# Patient Record
Sex: FEMALE | Race: WHITE | NOT HISPANIC OR LATINO | Employment: OTHER | URBAN - METROPOLITAN AREA
[De-identification: names, ages, dates, MRNs, and addresses within clinical notes are randomized per-mention and may not be internally consistent; named-entity substitution may affect disease eponyms.]

---

## 2017-03-01 ENCOUNTER — TELEPHONE (OUTPATIENT)
Dept: FAMILY MEDICINE | Facility: CLINIC | Age: 62
End: 2017-03-01

## 2017-03-01 NOTE — TELEPHONE ENCOUNTER
Social [employer]ation [employer], Survivors and Disability Insurance  Form placed in forms box on green side.  Melisa Watson

## 2017-03-06 ENCOUNTER — TELEPHONE (OUTPATIENT)
Dept: FAMILY MEDICINE | Facility: CLINIC | Age: 62
End: 2017-03-06

## 2017-03-06 NOTE — TELEPHONE ENCOUNTER
**When finished: Please make a copy for station  and a copy to be sent to scanning. Send originals with patient.     Date received: March 6, 2017   Doctor: Amarilis Miranda MD  Daytime phone number:   :     y: Mail back to patient  y: Mail or fax to destination requesting form  n: Patient to     Other notes:

## 2017-05-01 ENCOUNTER — TELEPHONE (OUTPATIENT)
Dept: FAMILY MEDICINE | Facility: CLINIC | Age: 62
End: 2017-05-01

## 2017-07-06 ENCOUNTER — TELEPHONE (OUTPATIENT)
Dept: FAMILY MEDICINE | Facility: CLINIC | Age: 62
End: 2017-07-06

## 2017-07-06 NOTE — TELEPHONE ENCOUNTER
Panel Management Review      Patient has the following on her problem list: None      Composite cancer screening  Chart review shows that this patient is due/due soon for the following Mammogram and Fecal Colorectal (FIT)  Summary:    Patient is due/failing the following:   tetanus, FIT and MAMMOGRAM    Action needed:   Patient needs office visit.    Type of outreach:    patient declined screenings    Questions for provider review:    None                                                                                                                                    Fern Blum [facility]     Chart routed to none .

## 2017-07-14 DIAGNOSIS — E78.5 HYPERLIPIDEMIA LDL GOAL <130: ICD-10-CM

## 2017-07-14 NOTE — TELEPHONE ENCOUNTER
Pravastatin     Last Written Prescription Date: 06/14/16  Last Fill Quantity: 90, # refills: 3  Last Office Visit with [facility] or [facility] prescribing provider: 06/14/16       Lab Results   Component Value Date    CHOL 227 12/31/2014     Lab Results   Component Value Date    HDL 49 12/31/2014     Lab Results   Component Value Date     12/31/2014     Lab Results   Component Value Date    TRIG 196 12/31/2014     Lab Results   Component Value Date    CHOLHDLRATIO 4.6 12/31/2014

## 2017-07-17 RX ORDER — PRAVASTATIN SODIUM 80 MG/1
80 TABLET ORAL DAILY
Qty: 30 TABLET | Refills: 0 | Status: SHIPPED | OUTPATIENT
Start: 2017-07-17 | End: 2017-08-16

## 2017-08-07 DIAGNOSIS — E78.5 HYPERLIPIDEMIA LDL GOAL <130: ICD-10-CM

## 2017-08-07 DIAGNOSIS — I10 ESSENTIAL HYPERTENSION, BENIGN: ICD-10-CM

## 2017-08-07 NOTE — TELEPHONE ENCOUNTER
HCTZ     Last Written Prescription Date: 06/14/16  Last Fill Quantity: 90, # refills: 3  Last Office Visit with [facility] or [facility] prescribing provider: 06/14/16       Potassium   Date Value Ref Range Status   12/31/2014 4.1 3.4 - 5.3 mmol/L Final     Creatinine   Date Value Ref Range Status   12/31/2014 0.66 0.52 - 1.04 mg/dL Final     BP Readings from Last 3 Encounters:   06/14/16 123/82   09/25/15 130/80   01/15/15 130/74     Metoprolol and Amlodipine  Last Written Prescription Date: 06/14/16  Last Fill Quantity: 90,  # refills: 3   Last Office Visit with [facility], [facility] or [facility] prescribing provider: 06/14/16                                             Pravastatin     Last Written Prescription Date: 07/17/17  Last Fill Quantity: 30, # refills: 0  Last Office Visit with [facility], [facility] or [facility] prescribing provider: 06/14/16       Lab Results   Component Value Date    CHOL 227 12/31/2014     Lab Results   Component Value Date    HDL 49 12/31/2014     Lab Results   Component Value Date     12/31/2014     Lab Results   Component Value Date    TRIG 196 12/31/2014     Lab Results   Component Value Date    CHOLHDLRATIO 4.6 12/31/2014

## 2017-08-08 RX ORDER — PRAVASTATIN SODIUM 80 MG/1
TABLET ORAL
Qty: 30 TABLET | Refills: 0 | OUTPATIENT
Start: 2017-08-08

## 2017-08-08 RX ORDER — METOPROLOL TARTRATE 50 MG
TABLET ORAL
Qty: 90 TABLET | Refills: 1 | Status: SHIPPED | OUTPATIENT
Start: 2017-08-08 | End: 2017-10-24

## 2017-08-08 RX ORDER — HYDROCHLOROTHIAZIDE 25 MG/1
TABLET ORAL
Qty: 90 TABLET | Refills: 3 | OUTPATIENT
Start: 2017-08-08

## 2017-08-08 RX ORDER — AMLODIPINE BESYLATE 5 MG/1
TABLET ORAL
Qty: 90 TABLET | Refills: 3 | OUTPATIENT
Start: 2017-08-08

## 2017-08-14 ENCOUNTER — OFFICE VISIT (OUTPATIENT)
Dept: FAMILY MEDICINE | Facility: CLINIC | Age: 62
End: 2017-08-14

## 2017-08-14 VITALS
BODY MASS INDEX: 27.19 KG/M2 | DIASTOLIC BLOOD PRESSURE: 68 MMHG | WEIGHT: 158.4 LBS | SYSTOLIC BLOOD PRESSURE: 114 MMHG | HEART RATE: 67 BPM

## 2017-08-14 DIAGNOSIS — E78.5 HYPERLIPIDEMIA LDL GOAL <130: Primary | ICD-10-CM

## 2017-08-14 DIAGNOSIS — I10 ESSENTIAL HYPERTENSION, BENIGN: ICD-10-CM

## 2017-08-14 DIAGNOSIS — I10 BENIGN ESSENTIAL HYPERTENSION: ICD-10-CM

## 2017-08-14 DIAGNOSIS — Z72.0 TOBACCO ABUSE: ICD-10-CM

## 2017-08-14 DIAGNOSIS — Z12.11 SPECIAL SCREENING FOR MALIGNANT NEOPLASMS, COLON: ICD-10-CM

## 2017-08-14 DIAGNOSIS — I47.10 PAROXYSMAL SUPRAVENTRICULAR TACHYCARDIA (H): ICD-10-CM

## 2017-08-14 PROCEDURE — 99213 OFFICE O/P EST LOW 20 MIN: CPT | Performed by: FAMILY MEDICINE

## 2017-08-14 NOTE — MR AVS SNAPSHOT
"              After Visit Summary   8/14/2017    Katherine Love    MRN: [id number]           Patient Information     Date Of Birth          1955        Visit Information        Provider Department      8/14/2017 2:40 PM Amarilis Miranda MD [facility]        Today's Diagnoses     Hyperlipidemia LDL goal <130    -  1    Benign essential hypertension        Special screening for malignant neoplasms, colon        Tobacco abuse        Paroxysmal supraventricular tachycardia (H)           Follow-ups after your visit        Future tests that were ordered for you today     Open Future Orders        Priority Expected Expires Ordered    Fecal colorectal cancer screen (FIT) Routine 9/4/2017 11/6/2017 8/14/2017            Who to contact     If you have questions or need follow up information about today's clinic visit or your schedule please contact [facility] directly at [phone].  Normal or non-critical lab and imaging results will be communicated to you by [employer], letter or phone within 4 business days after the clinic has received the results. If you do not hear from us within 7 days, please contact the clinic through [employer] or phone. If you have a critical or abnormal lab result, we will notify you by phone as soon as possible.  Submit refill requests through [employer] or call your pharmacy and they will forward the refill request to us. Please allow 3 business days for your refill to be completed.          Additional Information About Your Visit        MyChart Information     [employer] lets you send messages to your doctor, view your test results, renew your prescriptions, schedule appointments and more. To sign up, go to [email or web address] . Click on \"Log in\" on the left side of the screen, which will take you to the Welcome page. Then click on \"Sign up Now\" on the right side of the page.     You will be asked to enter the access code listed below, as well as some " personal information. Please follow the directions to create your username and password.     Your access code is: [email or web address]  Expires: 2017  3:41 PM     Your access code will  in 90 days. If you need help or a new code, please call your [place] clinic or [phone].        Care EveryWhere ID     This is your Care EveryWhere ID. This could be used by other organizations to access your [place] medical records  [id number]        Your Vitals Were     Pulse Last Period BMI (Body Mass Index)             67 2005 27.19 kg/m2          Blood Pressure from Last 3 Encounters:   17 114/68   16 123/82   09/25/15 130/80    Weight from Last 3 Encounters:   17 158 lb 6.4 oz (71.8 kg)   16 153 lb (69.4 kg)   09/25/15 154 lb (69.9 kg)              We Performed the Following     Basic metabolic panel     Lipid panel reflex to direct LDL        Primary Care Provider Office Phone # Fax #    Thalia Chin, HARRIET [phone]       XXX RESIGNED XXX [address] [address]        Equal Access to Services     LAVERNE REYNOLDS : Hadii aad ku hadasho Soomaali, waaxda luqadaha, qaybta kaalmada adeegyada, hao davisin haycarlos eduardon heidi silva . So [facility] [phone].    ATENCIÓN: Si habla español, tiene a blackwell disposición servicios gratuitos de asistencia lingüística. Llame al [phone].    We comply with applicable federal civil rights laws and [place] laws. We do not discriminate on the basis of race, color, national origin, age, disability sex, sexual orientation or gender identity.            Thank you!     Thank you for choosing [facility]  for your care. Our goal is always to provide you with excellent care. Hearing back from our patients is one way we can continue to improve our services. Please take a few minutes to complete the written survey that you may receive in the mail after your visit with us. Thank you!             Your  Updated Medication List - Protect others around you: Learn how to safely use, store and throw away your medicines at www.disposemymeds.org.          This list is accurate as of: 8/14/17  3:41 PM.  Always use your most recent med list.                   Brand Name Dispense Instructions for use Diagnosis    amLODIPine 5 MG tablet    NORVASC    90 tablet    Take 1 tablet (5 mg) by mouth daily    Essential hypertension, benign       hydrochlorothiazide 25 MG tablet    HYDRODIURIL    90 tablet    Take 1 tablet (25 mg) by mouth daily    Essential hypertension, benign       metoprolol 50 MG tablet    LOPRESSOR    90 tablet    TAKE ONE TABLET BY MOUTH EVERY NIGHT AT BEDTIME    Essential hypertension, benign       pravastatin 80 MG tablet    PRAVACHOL    30 tablet    Take 1 tablet (80 mg) by mouth daily (Needs follow-up appointment for this medication)    Hyperlipidemia LDL goal <130

## 2017-08-14 NOTE — PROGRESS NOTES
SUBJECTIVE:                                                    Katherine Love is a 61 year old female who presents to clinic today for the following health issues:    Chief Complaint   Patient presents with     Refill Request     medication refills     Hypertension     Hypertension Follow-up    Outpatient blood pressures are not being checked.    Low Salt Diet: low salt    Amount of exercise or physical activity: None but active during the day    Problems taking medications regularly: No    Medication side effects: none  Diet: regular (no restrictions)      She is still smoking lightly, does not plan to quit, figures a pack now lasts her a week.  She is due for a lipid recheck but wants to come back fasting for that, so lab orders will be placed as future and she will make a separate appointment.    Current Outpatient Prescriptions   Medication Sig     metoprolol (LOPRESSOR) 50 MG tablet TAKE ONE TABLET BY MOUTH EVERY NIGHT AT BEDTIME     pravastatin (PRAVACHOL) 80 MG tablet Take 1 tablet (80 mg) by mouth daily (Needs follow-up appointment for this medication)     amLODIPine (NORVASC) 5 MG tablet Take 1 tablet (5 mg) by mouth daily     hydrochlorothiazide (HYDRODIURIL) 25 MG tablet Take 1 tablet (25 mg) by mouth daily     She is followed for hypertension, and is on a beta blocker for a history of benign PSVT.  She was once diagnosed with mitral valve prolapse on an echocardiogram, but this was not confirmed on a subsequent echo.    She is requesting medications refills, but is overdue for lab update, so will be given a short refill until she accomplishes this.    She has not had a mammogram since 2014 but declines to consider one at this time.    She is willing to take home a FIT test.    She is overdue for an Adacel, is also eligible for a Zostavax and given her smoking history, for a Pneumovax, but declines any of these at this time.    She has no complaints, denies chest pains, rarely gets palpitations any more,  has no GI, , joint or skin concerns.    OBJECTIVE: /68 (BP Location: Right arm, Patient Position: Chair, Cuff Size: Adult Regular)  Pulse 67  Wt 158 lb 6.4 oz (71.8 kg)  LMP 05/08/2005  BMI 27.19 kg/m2    ASSESSMENT: hypertension, well controlled    PLAN: Fasting lipid profile (she prefers this rather than just getting a nonfasting profile now) and BMP ordered as future labs.  Metoprolol was already renewed for six months, and I will renew her amlodipine for the same time, but pravastatin and HCTZ will just be renewed for one month as we do not yet have current labs and as of the time of this note 24 hours after her visit, she has not yet made a lab appointment. She will need to select a new PCP and get additional refills of these when needed. She is encouraged to come in for a flu shot and general wellness exam this fall to establish care with the new PCP.    Amarilis Miranda md

## 2017-08-14 NOTE — NURSING NOTE
"Chief Complaint   Patient presents with     Refill Request     medication refills     Hypertension       Initial /68 (BP Location: Right arm, Patient Position: Chair, Cuff Size: Adult Regular)  Pulse 67  Wt 158 lb 6.4 oz (71.8 kg)  LMP 05/08/2005  BMI 27.19 kg/m2 Estimated body mass index is 27.19 kg/(m^2) as calculated from the following:    Height as of 6/14/16: 5' 4\" (1.626 m).    Weight as of this encounter: 158 lb 6.4 oz (71.8 kg).  Medication Reconciliation: complete   Fern Blum CMA    "

## 2017-08-16 RX ORDER — PRAVASTATIN SODIUM 80 MG/1
80 TABLET ORAL DAILY
Qty: 30 TABLET | Refills: 0 | Status: SHIPPED | OUTPATIENT
Start: 2017-08-16 | End: 2017-09-19

## 2017-08-16 RX ORDER — HYDROCHLOROTHIAZIDE 25 MG/1
25 TABLET ORAL DAILY
Qty: 30 TABLET | Refills: 0 | Status: SHIPPED | OUTPATIENT
Start: 2017-08-16 | End: 2017-09-19

## 2017-08-16 RX ORDER — AMLODIPINE BESYLATE 5 MG/1
5 TABLET ORAL DAILY
Qty: 90 TABLET | Refills: 1 | Status: SHIPPED | OUTPATIENT
Start: 2017-08-16 | End: 2017-10-24

## 2017-09-19 DIAGNOSIS — I10 ESSENTIAL HYPERTENSION, BENIGN: ICD-10-CM

## 2017-09-19 DIAGNOSIS — E78.5 HYPERLIPIDEMIA LDL GOAL <130: ICD-10-CM

## 2017-09-19 NOTE — TELEPHONE ENCOUNTER
HCTZ      Last Written Prescription Date: 08/16/2017  Last Fill Quantity: 30, # refills: 0  Last Office Visit with [facility], [facility] or [facility] prescribing provider: 08/14/2017       Potassium   Date Value Ref Range Status   12/31/2014 4.1 3.4 - 5.3 mmol/L Final     Creatinine   Date Value Ref Range Status   12/31/2014 0.66 0.52 - 1.04 mg/dL Final     BP Readings from Last 3 Encounters:   08/14/17 114/68   06/14/16 123/82   09/25/15 130/80       Pravastatin     Last Written Prescription Date: 08/16/2017  Last Fill Quantity: 30, # refills: 0  Last Office Visit with [facility], [facility] or [facility] prescribing provider: 08/14/2017       Lab Results   Component Value Date    CHOL 227 12/31/2014     Lab Results   Component Value Date    HDL 49 12/31/2014     Lab Results   Component Value Date     12/31/2014     Lab Results   Component Value Date    TRIG 196 12/31/2014     Lab Results   Component Value Date    CHOLHDLRATIO 4.6 12/31/2014     Horacio POWERS (R)

## 2017-09-20 RX ORDER — PRAVASTATIN SODIUM 80 MG/1
80 TABLET ORAL DAILY
Qty: 30 TABLET | Refills: 0 | Status: SHIPPED | OUTPATIENT
Start: 2017-09-20 | End: 2017-10-19

## 2017-09-20 RX ORDER — HYDROCHLOROTHIAZIDE 25 MG/1
TABLET ORAL
Qty: 30 TABLET | Refills: 0 | Status: SHIPPED | OUTPATIENT
Start: 2017-09-20 | End: 2017-10-19

## 2017-09-21 ENCOUNTER — TELEPHONE (OUTPATIENT)
Dept: FAMILY MEDICINE | Facility: CLINIC | Age: 62
End: 2017-09-21

## 2017-09-21 NOTE — TELEPHONE ENCOUNTER
Panel Management Review      Patient has the following on her problem list:     Hypertension   Last three blood pressure readings:  BP Readings from Last 3 Encounters:   08/14/17 114/68   06/14/16 123/82   09/25/15 130/80     Blood pressure: Passed    HTN Guidelines:  Age 18-59 BP range:  Less than 140/90  Age 60-85 with Diabetes:  Less than 140/90  Age 60-85 without Diabetes:  less than 150/90        Composite cancer screening  Chart review shows that this patient is due/due soon for the following Mammogram and Fecal Colorectal (FIT)  Summary:    Patient is due/failing the following:   FIT and MAMMOGRAM    Action needed:   Patient needs referral/order: mammo/fit    Type of outreach:    Phone, left message for patient to call back.     Questions for provider review:    None                                                                                                                                    Chrissy Wilson MA       Chart routed to Care Team .

## 2017-10-19 DIAGNOSIS — I10 ESSENTIAL HYPERTENSION, BENIGN: ICD-10-CM

## 2017-10-19 DIAGNOSIS — E78.5 HYPERLIPIDEMIA LDL GOAL <130: ICD-10-CM

## 2017-10-19 NOTE — LETTER
[facility]  [address]  [facility] [address]  Phone: [phone]    October 20, 2017    Katherine Love                                                                                                                         [id number] [facility] [address]            Dear Ms. Love,    We are concerned about your health care.  We recently provided you with a medication refill.  Many medications require routine follow-up with your Doctor.      At this time we ask that: You make an appointment for for routine labs for medication monitoring. You will also need to establish care with a Primary Care Provider to follow your medications and refill them. (Dr. Amarilis Miranda has retired).    Your prescription: Has been refilled for 1 month so you may have time for the above noted follow-up. Please be seen/have lab work drawn prior to needing your next refill of medication.     Thank you,    [facility] RN

## 2017-10-20 RX ORDER — PRAVASTATIN SODIUM 80 MG/1
TABLET ORAL
Qty: 30 TABLET | Refills: 0 | Status: SHIPPED | OUTPATIENT
Start: 2017-10-20 | End: 2017-10-24

## 2017-10-20 RX ORDER — HYDROCHLOROTHIAZIDE 25 MG/1
TABLET ORAL
Qty: 30 TABLET | Refills: 0 | Status: SHIPPED | OUTPATIENT
Start: 2017-10-20 | End: 2017-10-24

## 2017-10-20 NOTE — TELEPHONE ENCOUNTER
Needs appointment for lab and new PCP. Letter sent and note sent to pharmacy as well. Kathe Zaman RN

## 2017-10-23 ENCOUNTER — ALLIED HEALTH/NURSE VISIT (OUTPATIENT)
Dept: FAMILY MEDICINE | Facility: CLINIC | Age: 62
End: 2017-10-23

## 2017-10-23 DIAGNOSIS — Z76.0 ENCOUNTER FOR MEDICATION REFILL: Primary | ICD-10-CM

## 2017-10-23 DIAGNOSIS — E78.5 HYPERLIPIDEMIA LDL GOAL <130: ICD-10-CM

## 2017-10-23 DIAGNOSIS — I10 ESSENTIAL HYPERTENSION, BENIGN: Primary | ICD-10-CM

## 2017-10-23 LAB
ANION GAP SERPL CALCULATED.3IONS-SCNC: 5 MMOL/L (ref 3–14)
BUN SERPL-MCNC: 11 MG/DL (ref 7–30)
CALCIUM SERPL-MCNC: 9.3 MG/DL (ref 8.5–10.1)
CHLORIDE SERPL-SCNC: 101 MMOL/L (ref 94–109)
CHOLEST SERPL-MCNC: 195 MG/DL
CO2 SERPL-SCNC: 33 MMOL/L (ref 20–32)
CREAT SERPL-MCNC: 0.62 MG/DL (ref 0.52–1.04)
GFR SERPL CREATININE-BSD FRML MDRD: >90 ML/MIN/1.7M2
GLUCOSE SERPL-MCNC: 106 MG/DL (ref 70–99)
HDLC SERPL-MCNC: 48 MG/DL
LDLC SERPL CALC-MCNC: 107 MG/DL
NONHDLC SERPL-MCNC: 147 MG/DL
POTASSIUM SERPL-SCNC: 2.9 MMOL/L (ref 3.4–5.3)
SODIUM SERPL-SCNC: 139 MMOL/L (ref 133–144)
TRIGL SERPL-MCNC: 198 MG/DL

## 2017-10-23 PROCEDURE — 36415 COLL VENOUS BLD VENIPUNCTURE: CPT | Performed by: NURSE PRACTITIONER

## 2017-10-23 PROCEDURE — 99207 ZZC NO CHARGE NURSE ONLY: CPT

## 2017-10-23 PROCEDURE — 80061 LIPID PANEL: CPT | Performed by: NURSE PRACTITIONER

## 2017-10-23 PROCEDURE — 80048 BASIC METABOLIC PNL TOTAL CA: CPT | Performed by: NURSE PRACTITIONER

## 2017-10-23 NOTE — MR AVS SNAPSHOT
After Visit Summary   10/23/2017    Katherine Love    MRN: [id number]           Patient Information     Date Of Birth          1955        Visit Information        Provider Department      10/23/2017 10:30 AM [place] CL RN [facility]        Today's Diagnoses     Encounter for medication refill    -  1       Follow-ups after your visit        Your next 10 appointments already scheduled     Oct 23, 2017 11:00 AM CDT   LAB with CL LAB   [facility]    [address] [facility] [address]   [phone]           Patient must bring picture ID. Patient should be prepared to give a urine specimen  Please do not eat 10-12 hours before your appointment if you are coming in fasting for labs on lipids, cholesterol, or glucose (sugar). Pregnant women should follow their Care Team instructions. Water with medications is okay. Do not drink coffee or other fluids. If you have concerns about taking  your medications, please ask at office or if scheduling via [employer], send a message by clicking on Secure Messaging, Message Your Care Team.            Oct 24, 2017 11:00 AM CDT   SHORT with HARRIET Montez CNP   [facility]    [address] [id number] [phone]              Future tests that were ordered for you today     Open Future Orders        Priority Expected Expires Ordered    Basic metabolic panel Routine  11/23/2017 10/23/2017    Lipid panel reflex to direct LDL Routine  11/23/2017 10/23/2017            Who to contact     If you have questions or need follow up information about today's clinic visit or your schedule please contact [facility] directly at [phone].  Normal or non-critical lab and imaging results will be communicated to you by MyChart, letter or phone within 4 business days after the clinic has received the results.  "If you do not hear from us within 7 days, please contact the clinic through [employer] or phone. If you have a critical or abnormal lab result, we will notify you by phone as soon as possible.  Submit refill requests through [employer] or call your pharmacy and they will forward the refill request to us. Please allow 3 business days for your refill to be completed.          Additional Information About Your Visit        [employer] Information     [employer] lets you send messages to your doctor, view your test results, renew your prescriptions, schedule appointments and more. To sign up, go to [email or web address] . Click on \"Log in\" on the left side of the screen, which will take you to the Welcome page. Then click on \"Sign up Now\" on the right side of the page.     You will be asked to enter the access code listed below, as well as some personal information. Please follow the directions to create your username and password.     Your access code is: [email or web address]  Expires: 2017  3:41 PM     Your access code will  in 90 days. If you need help or a new code, please call your [place] clinic or [phone].        Care EveryWhere ID     This is your Care EveryWhere ID. This could be used by other organizations to access your [place] medical records  [id number]        Your Vitals Were     Last Period                   2005            Blood Pressure from Last 3 Encounters:   17 114/68   16 123/82   09/25/15 130/80    Weight from Last 3 Encounters:   17 158 lb 6.4 oz (71.8 kg)   16 153 lb (69.4 kg)   09/25/15 154 lb (69.9 kg)              Today, you had the following     No orders found for display       Primary Care Provider Fax #    Provider Not In System [phone]                Equal Access to Services     KELSEY REYNOLDS : Cat Knapp, waana maria dunaway, qacarlos zhang, hao gayle. So [facility] [phone].    ATENCIÓN: Si " matthew medina, tiene a blackwell disposición servicios gratuitos de asistencia lingüística. Mary mendez [phone].    We comply with applicable federal civil rights laws and [place] laws. We do not discriminate on the basis of race, color, national origin, age, disability, sex, sexual orientation, or gender identity.            Thank you!     Thank you for choosing [facility]  for your care. Our goal is always to provide you with excellent care. Hearing back from our patients is one way we can continue to improve our services. Please take a few minutes to complete the written survey that you may receive in the mail after your visit with us. Thank you!             Your Updated Medication List - Protect others around you: Learn how to safely use, store and throw away your medicines at www.disposemymeds.org.          This list is accurate as of: 10/23/17 10:35 AM.  Always use your most recent med list.                   Brand Name Dispense Instructions for use Diagnosis    amLODIPine 5 MG tablet    NORVASC    90 tablet    Take 1 tablet (5 mg) by mouth daily    Essential hypertension, benign       hydrochlorothiazide 25 MG tablet    HYDRODIURIL    30 tablet    TAKE ONE TABLET BY MOUTH EVERY DAY (NEEDS LAB APPT.)    Essential hypertension, benign       metoprolol 50 MG tablet    LOPRESSOR    90 tablet    TAKE ONE TABLET BY MOUTH EVERY NIGHT AT BEDTIME    Essential hypertension, benign       pravastatin 80 MG tablet    PRAVACHOL    30 tablet    TAKE ONE TABLET BY MOUTH EVERY DAY (NEEDS FASTING LAB WORK)    Hyperlipidemia LDL goal <130

## 2017-10-24 ENCOUNTER — OFFICE VISIT (OUTPATIENT)
Dept: FAMILY MEDICINE | Facility: CLINIC | Age: 62
End: 2017-10-24

## 2017-10-24 VITALS
DIASTOLIC BLOOD PRESSURE: 77 MMHG | TEMPERATURE: 99.3 F | WEIGHT: 157 LBS | SYSTOLIC BLOOD PRESSURE: 131 MMHG | HEART RATE: 67 BPM | RESPIRATION RATE: 16 BRPM | HEIGHT: 64 IN | BODY MASS INDEX: 26.8 KG/M2

## 2017-10-24 DIAGNOSIS — E78.5 HYPERLIPIDEMIA LDL GOAL <130: ICD-10-CM

## 2017-10-24 DIAGNOSIS — E87.6 HYPOKALEMIA: ICD-10-CM

## 2017-10-24 DIAGNOSIS — I10 ESSENTIAL HYPERTENSION, BENIGN: Primary | ICD-10-CM

## 2017-10-24 DIAGNOSIS — Z12.11 SPECIAL SCREENING FOR MALIGNANT NEOPLASMS, COLON: ICD-10-CM

## 2017-10-24 PROCEDURE — 99214 OFFICE O/P EST MOD 30 MIN: CPT | Performed by: NURSE PRACTITIONER

## 2017-10-24 RX ORDER — PRAVASTATIN SODIUM 80 MG/1
TABLET ORAL
Qty: 90 TABLET | Refills: 3 | Status: SHIPPED | OUTPATIENT
Start: 2017-10-24 | End: 2018-02-23

## 2017-10-24 RX ORDER — AMLODIPINE BESYLATE 5 MG/1
5 TABLET ORAL DAILY
Qty: 90 TABLET | Refills: 3 | Status: SHIPPED | OUTPATIENT
Start: 2017-10-24 | End: 2019-01-14

## 2017-10-24 RX ORDER — METOPROLOL TARTRATE 50 MG
TABLET ORAL
Qty: 90 TABLET | Refills: 3 | Status: SHIPPED | OUTPATIENT
Start: 2017-10-24 | End: 2019-01-08

## 2017-10-24 RX ORDER — HYDROCHLOROTHIAZIDE 25 MG/1
TABLET ORAL
Qty: 90 TABLET | Refills: 3 | Status: SHIPPED | OUTPATIENT
Start: 2017-10-24 | End: 2019-01-14

## 2017-10-24 RX ORDER — POTASSIUM CHLORIDE 1500 MG/1
20 TABLET, EXTENDED RELEASE ORAL DAILY
Qty: 30 TABLET | Refills: 1 | Status: SHIPPED | OUTPATIENT
Start: 2017-10-24 | End: 2019-01-15

## 2017-10-24 NOTE — PROGRESS NOTES
SUBJECTIVE:   Katherine Love is a 61 year old female who presents to clinic today for the following health issues:      New Patient/Transfer of Care  Hyperlipidemia Follow-Up      Rate your low fat/cholesterol diet?: not monitoring fat    Taking statin?  Yes, no muscle aches from statin    Other lipid medications/supplements?:  none    Hypertension Follow-up      Outpatient blood pressures are not being checked.    Low Salt Diet: not monitoring salt          Amount of exercise or physical activity: None    Problems taking medications regularly: No    Medication side effects: none  Diet: regular (no restrictions)          Problem list and histories reviewed & adjusted, as indicated.  Additional history: very pleasant, here to establish care and mainly just needs refills. She offers no new concerns or worries.   She states she's feeling fine.  She is retired, used to work in the kitchen at local schools. She is  with 5 children and 6 grand children.    Patient Active Problem List   Diagnosis     Mitral valve disorder     Essential hypertension, benign     Paroxysmal supraventricular tachycardia (H)     Tobacco use disorder     Degeneration of lumbar or lumbosacral intervertebral disc     S/P hysterectomy     Hyperlipidemia LDL goal <130     Medically noncompliant     Past Surgical History:   Procedure Laterality Date     C LIGATE FALLOPIAN TUBE,POSTPARTUM       C PART REMOVAL COLON W ANASTOMOSIS      meckels diverticulum     HC REPAIR UMBILICAL RAMA,5+Y/O,REDUC       HC TREATMENT MISSED  SURG, 1ST TRIMESTER       HYSTERECTOMY, PAP NO LONGER INDICATED  05       Social History   Substance Use Topics     Smoking status: Current Some Day Smoker     Packs/day: 0.20     Years: 40.00     Smokeless tobacco: Never Used      Comment: 1 pack per week     Alcohol use 7.2 oz/week     12 Cans of beer per week      Comment: occasional     Family History   Problem Relation Age of Onset     Arthritis  "Mother      Neurologic Disorder Mother      epilepsy     C.A.D. Father       at 72 of [place]         Current Outpatient Prescriptions   Medication Sig Dispense Refill     pravastatin (PRAVACHOL) 80 MG tablet Take one by mouth daily. 90 tablet 3     hydrochlorothiazide (HYDRODIURIL) 25 MG tablet Take one by mouth daily 90 tablet 3     amLODIPine (NORVASC) 5 MG tablet Take 1 tablet (5 mg) by mouth daily 90 tablet 3     metoprolol (LOPRESSOR) 50 MG tablet TAKE ONE TABLET BY MOUTH EVERY NIGHT AT BEDTIME 90 tablet 3     potassium chloride SA (KLOR-CON) 20 MEQ CR tablet Take 1 tablet (20 mEq) by mouth daily 30 tablet 1     [DISCONTINUED] pravastatin (PRAVACHOL) 80 MG tablet TAKE ONE TABLET BY MOUTH EVERY DAY (NEEDS FASTING LAB WORK) 30 tablet 0     [DISCONTINUED] hydrochlorothiazide (HYDRODIURIL) 25 MG tablet TAKE ONE TABLET BY MOUTH EVERY DAY (NEEDS LAB APPT.) 30 tablet 0     [DISCONTINUED] amLODIPine (NORVASC) 5 MG tablet Take 1 tablet (5 mg) by mouth daily 90 tablet 1     [DISCONTINUED] metoprolol (LOPRESSOR) 50 MG tablet TAKE ONE TABLET BY MOUTH EVERY NIGHT AT BEDTIME 90 tablet 1     No Known Allergies  BP Readings from Last 3 Encounters:   10/24/17 131/77   17 114/68   16 123/82    Wt Readings from Last 3 Encounters:   10/24/17 157 lb (71.2 kg)   17 158 lb 6.4 oz (71.8 kg)   16 153 lb (69.4 kg)                        Reviewed and updated as needed this visit by clinical staffTobacco  Allergies  Med Hx  Surg Hx  Fam Hx  Soc Hx      Reviewed and updated as needed this visit by Provider          ROS: 10 point ROS neg other than the symptoms noted above in the HPI.    OBJECTIVE:     /77 (BP Location: Right arm, Patient Position: Chair, Cuff Size: Adult Regular)  Pulse 67  Temp 99.3  F (37.4  C) (Oral)  Resp 16  Ht 5' 3.5\" (1.613 m)  Wt 157 lb (71.2 kg)  LMP 2005  BMI 27.38 kg/m2  Body mass index is 27.38 kg/(m^2).  GENERAL: healthy, alert and no distress  HENT: ear canals " and TM's normal, pharynx without erythema  NECK: no adenopathy, no asymmetry  RESP: lungs clear to auscultation - no rales, rhonchi or wheezes  CV: regular rate and rhythm, normal S1 S2, no S3 or S4, no murmur  ABDOMEN: soft, nontender, no hepatosplenomegaly, no masses and bowel sounds normal  MS: no gross musculoskeletal defects noted      Diagnostic Test Results:  Results for orders placed or performed in visit on 10/23/17   Basic metabolic panel   Result Value Ref Range    Sodium 139 133 - 144 mmol/L    Potassium 2.9 (L) 3.4 - 5.3 mmol/L    Chloride 101 94 - 109 mmol/L    Carbon Dioxide 33 (H) 20 - 32 mmol/L    Anion Gap 5 3 - 14 mmol/L    Glucose 106 (H) 70 - 99 mg/dL    Urea Nitrogen 11 7 - 30 mg/dL    Creatinine 0.62 0.52 - 1.04 mg/dL    GFR Estimate >90 >60 mL/min/1.7m2    GFR Estimate If Black >90 >60 mL/min/1.7m2    Calcium 9.3 8.5 - 10.1 mg/dL   Lipid panel reflex to direct LDL   Result Value Ref Range    Cholesterol 195 <200 mg/dL    Triglycerides 198 (H) <150 mg/dL    HDL Cholesterol 48 (L) >49 mg/dL    LDL Cholesterol Calculated 107 (H) <100 mg/dL    Non HDL Cholesterol 147 (H) <130 mg/dL       ASSESSMENT/PLAN:             1. BENIGN HYPERTENSION    - hydrochlorothiazide (HYDRODIURIL) 25 MG tablet; Take one by mouth daily  Dispense: 90 tablet; Refill: 3  - amLODIPine (NORVASC) 5 MG tablet; Take 1 tablet (5 mg) by mouth daily  Dispense: 90 tablet; Refill: 3  - metoprolol (LOPRESSOR) 50 MG tablet; TAKE ONE TABLET BY MOUTH EVERY NIGHT AT BEDTIME  Dispense: 90 tablet; Refill: 3  - potassium chloride SA (KLOR-CON) 20 MEQ CR tablet; Take 1 tablet (20 mEq) by mouth daily  Dispense: 30 tablet; Refill: 1  Continue same medications for hypertension as her pressure is well controlled. Will add potassium supplement and recheck potassium.    2. Hyperlipidemia LDL goal <130    - pravastatin (PRAVACHOL) 80 MG tablet; Take one by mouth daily.  Dispense: 90 tablet; Refill: 3    3. Special screening for malignant  neoplasms, colon    - Fecal colorectal cancer screen (FIT); Future    4. Hypokalemia    - potassium chloride SA (KLOR-CON) 20 MEQ CR tablet; Take 1 tablet (20 mEq) by mouth daily  Dispense: 30 tablet; Refill: 1  - Basic metabolic panel  (Ca, Cl, CO2, Creat, Gluc, K, Na, BUN); Future    She was encouraged to get tetanus booster, flu shot, FIT test and mammogram.  She was hesitant for all of those. Does state she will return in a couple weeks to recheck potassium after starting supplement and may consider tetanus booster at that time.      See Patient Instructions  Patient Instructions   Continue same medications.  Add the potassium supplement daily.  Return to have that rechecked at the lab in 2-4 weeks.  Follow up if symptoms persist or worsen and as needed.        Thank you for choosing [facility].  You may be receiving a survey in the mail from [employer] regarding your visit today.  Please take a few minutes to complete and return the survey to let us know how we are doing.      Our Clinic hours are:  Mondays    7:20 am - 7 pm  Tues -  Fri  7:20 am - 5 pm    Clinic Phone: [phone]    The clinic lab opens at 7:30 am Mon - Fri and appointments are required.    [facility] [place]  Ph. [phone]  Monday-Thursday 8 am - 7pm  Tues/Wed/Fri 8 am - 5:30 pm             HARRIET Montez CNP  [facility]

## 2017-10-24 NOTE — NURSING NOTE
"Chief Complaint   Patient presents with     Establish Care       Initial /77 (BP Location: Right arm, Patient Position: Chair, Cuff Size: Adult Regular)  Pulse 67  Temp 99.3  F (37.4  C) (Oral)  Resp 16  Ht 5' 3.5\" (1.613 m)  Wt 157 lb (71.2 kg)  LMP 05/08/2005  BMI 27.38 kg/m2 Estimated body mass index is 27.38 kg/(m^2) as calculated from the following:    Height as of this encounter: 5' 3.5\" (1.613 m).    Weight as of this encounter: 157 lb (71.2 kg).  Medication Reconciliation: complete  "

## 2017-10-24 NOTE — MR AVS SNAPSHOT
After Visit Summary   10/24/2017    Katherine Love    MRN: [id number]           Patient Information     Date Of Birth          1955        Visit Information        Provider Department      10/24/2017 11:00 AM Luci Silver APRN CNP [facility]        Today's Diagnoses     Special screening for malignant neoplasms, colon    -  1    Hyperlipidemia LDL goal <130        BENIGN HYPERTENSION        Hypokalemia          Care Instructions    Continue same medications.  Add the potassium supplement daily.  Return to have that rechecked at the lab in 2-4 weeks.  Follow up if symptoms persist or worsen and as needed.        Thank you for choosing [facility].  You may be receiving a survey in the mail from [employer] regarding your visit today.  Please take a few minutes to complete and return the survey to let us know how we are doing.      Our Clinic hours are:  Mondays    7:20 am - 7 pm  Tues -  Fri  7:20 am - 5 pm    Clinic Phone: [phone]    The clinic lab opens at 7:30 am Mon - Fri and appointments are required.    [facility]. [phone]  Monday-Thursday 8 am - 7pm  Tues/Wed/Fri 8 am - 5:30 pm                 Follow-ups after your visit        Future tests that were ordered for you today     Open Future Orders        Priority Expected Expires Ordered    Basic metabolic panel  (Ca, Cl, CO2, Creat, Gluc, K, Na, BUN) Routine  10/24/2018 10/24/2017    Fecal colorectal cancer screen (FIT) Routine 11/14/2017 1/16/2018 10/24/2017            Who to contact     If you have questions or need follow up information about today's clinic visit or your schedule please contact [facility] directly at [phone].  Normal or non-critical lab and imaging results will be communicated to you by MyChart, letter or phone within 4 business days after the clinic has received the results. If you do not hear from us within 7 days, please contact the  "clinic through [employer] or phone. If you have a critical or abnormal lab result, we will notify you by phone as soon as possible.  Submit refill requests through [employer] or call your pharmacy and they will forward the refill request to us. Please allow 3 business days for your refill to be completed.          Additional Information About Your Visit        [employer] Information     [employer] lets you send messages to your doctor, view your test results, renew your prescriptions, schedule appointments and more. To sign up, go to [email or web address] . Click on \"Log in\" on the left side of the screen, which will take you to the Welcome page. Then click on \"Sign up Now\" on the right side of the page.     You will be asked to enter the access code listed below, as well as some personal information. Please follow the directions to create your username and password.     Your access code is: [email or web address]  Expires: 2017  3:41 PM     Your access code will  in 90 days. If you need help or a new code, please call your [place] clinic or [phone].        Care EveryWhere ID     This is your Care EveryWhere ID. This could be used by other organizations to access your [place] medical records  [id number]        Your Vitals Were     Pulse Temperature Respirations Height Last Period BMI (Body Mass Index)    67 99.3  F (37.4  C) (Oral) 16 5' 3.5\" (1.613 m) 2005 27.38 kg/m2       Blood Pressure from Last 3 Encounters:   10/24/17 131/77   17 114/68   16 123/82    Weight from Last 3 Encounters:   10/24/17 157 lb (71.2 kg)   17 158 lb 6.4 oz (71.8 kg)   16 153 lb (69.4 kg)                 Today's Medication Changes          These changes are accurate as of: 10/24/17 11:35 AM.  If you have any questions, ask your nurse or doctor.               Start taking these medicines.        Dose/Directions    potassium chloride SA 20 MEQ CR tablet   Commonly known as:  KLOR-CON   Used for:  Essential " hypertension, benign, Hypokalemia   Started by:  Luci Silevr APRN CNP        Dose:  20 mEq   Take 1 tablet (20 mEq) by mouth daily   Quantity:  30 tablet   Refills:  1         These medicines have changed or have updated prescriptions.        Dose/Directions    hydrochlorothiazide 25 MG tablet   Commonly known as:  HYDRODIURIL   This may have changed:  See the new instructions.   Used for:  Essential hypertension, benign   Changed by:  Luci Silver APRN CNP        Take one by mouth daily   Quantity:  90 tablet   Refills:  3       metoprolol 50 MG tablet   Commonly known as:  LOPRESSOR   This may have changed:  See the new instructions.   Used for:  Essential hypertension, benign   Changed by:  Luci Silver APRN CNP        TAKE ONE TABLET BY MOUTH EVERY NIGHT AT BEDTIME   Quantity:  90 tablet   Refills:  3       pravastatin 80 MG tablet   Commonly known as:  PRAVACHOL   This may have changed:  See the new instructions.   Used for:  Hyperlipidemia LDL goal <130   Changed by:  Luci Silver APRN CNP        Take one by mouth daily.   Quantity:  90 tablet   Refills:  3            Where to get your medicines      These medications were sent to [facility] [address]  [address] [facility] [address]     Phone:  [phone]     amLODIPine 5 MG tablet    hydrochlorothiazide 25 MG tablet    metoprolol 50 MG tablet    potassium chloride SA 20 MEQ CR tablet    pravastatin 80 MG tablet                Primary Care Provider Office Phone # Fax #    HARRIET Montez [phone] [address] [facility] [address]        Equal Access to Services     [facility]: Hadii aad ku hadasho Soomaali, waaxda luqadaha, qaybta kaalmada adeegyada, hao gayle. So [facility] [phone].    ATENCIÓN: Si habla español, tiene a blackwell disposición servicios gratuitos de asistencia  lingüística. Mary al [phone].    We comply with applicable federal civil rights laws and [place] laws. We do not discriminate on the basis of race, color, national origin, age, disability, sex, sexual orientation, or gender identity.            Thank you!     Thank you for choosing [facility]  for your care. Our goal is always to provide you with excellent care. Hearing back from our patients is one way we can continue to improve our services. Please take a few minutes to complete the written survey that you may receive in the mail after your visit with us. Thank you!             Your Updated Medication List - Protect others around you: Learn how to safely use, store and throw away your medicines at www.disposemymeds.org.          This list is accurate as of: 10/24/17 11:35 AM.  Always use your most recent med list.                   Brand Name Dispense Instructions for use Diagnosis    amLODIPine 5 MG tablet    NORVASC    90 tablet    Take 1 tablet (5 mg) by mouth daily    Essential hypertension, benign       hydrochlorothiazide 25 MG tablet    HYDRODIURIL    90 tablet    Take one by mouth daily    Essential hypertension, benign       metoprolol 50 MG tablet    LOPRESSOR    90 tablet    TAKE ONE TABLET BY MOUTH EVERY NIGHT AT BEDTIME    Essential hypertension, benign       potassium chloride SA 20 MEQ CR tablet    KLOR-CON    30 tablet    Take 1 tablet (20 mEq) by mouth daily    Essential hypertension, benign, Hypokalemia       pravastatin 80 MG tablet    PRAVACHOL    90 tablet    Take one by mouth daily.    Hyperlipidemia LDL goal <130

## 2017-10-24 NOTE — PATIENT INSTRUCTIONS
Continue same medications.  Add the potassium supplement daily.  Return to have that rechecked at the lab in 2-4 weeks.  Follow up if symptoms persist or worsen and as needed.        Thank you for choosing [facility].  You may be receiving a survey in the mail from [employer]ician[employer] regarding your visit today.  Please take a few minutes to complete and return the survey to let us know how we are doing.      Our Clinic hours are:  Mondays    7:20 am - 7 pm  Tues -  Fri  7:20 am - 5 pm    Clinic Phone: [phone]    The clinic lab opens at 7:30 am Mon - Fri and appointments are required.    [facility]  Ph. [phone]  Monday-Thursday 8 am - 7pm  Tues/Wed/Fri 8 am - 5:30 pm

## 2018-01-26 ENCOUNTER — TELEPHONE (OUTPATIENT)
Dept: FAMILY MEDICINE | Facility: CLINIC | Age: 63
End: 2018-01-26

## 2018-01-26 ENCOUNTER — TELEPHONE (OUTPATIENT)
Dept: LAB | Facility: SCHOOL | Age: 63
End: 2018-01-26

## 2018-01-26 NOTE — TELEPHONE ENCOUNTER
Panel Management Review      Patient has the following on her problem list:     Hypertension   Last three blood pressure readings:  BP Readings from Last 3 Encounters:   10/24/17 131/77   08/14/17 114/68   06/14/16 123/82     Blood pressure: Passed    HTN Guidelines:  Age 18-59 BP range:  Less than 140/90  Age 60-85 with Diabetes:  Less than 140/90  Age 60-85 without Diabetes:  less than 150/90      Composite cancer screening  Chart review shows that this patient is due/due soon for the following Mammogram and Fecal Colorectal (FIT)  Summary:    Patient is due/failing the following:   Blood work from starting BP medication, FIT and MAMMOGRAM    Action needed:   Patient needs office visit for see above.    Type of outreach:    Phone, left message for patient to call back.     Questions for provider review:    None                                                                                                                                    Purvi Simental MA

## 2018-02-23 DIAGNOSIS — E78.5 HYPERLIPIDEMIA LDL GOAL <130: ICD-10-CM

## 2018-02-26 RX ORDER — PRAVASTATIN SODIUM 80 MG/1
TABLET ORAL
Qty: 90 TABLET | Refills: 3 | Status: SHIPPED | OUTPATIENT
Start: 2018-02-26 | End: 2019-01-14

## 2018-02-26 NOTE — TELEPHONE ENCOUNTER
Prescription approved per [facility] Refill Protocol.  Pt had refills remaining.KpavelRN

## 2018-02-26 NOTE — TELEPHONE ENCOUNTER
"Requested Prescriptions   Pending Prescriptions Disp Refills     pravastatin (PRAVACHOL) 80 MG tablet  Last Written Prescription Date:  10/24/2017  Last Fill Quantity: 90,  # refills: 3   Last office visit: 10/24/2017 with prescribing provider:  Adrianne   Future Office Visit:     90 tablet 3     Sig: Take one by mouth daily.    Statins Protocol Failed    2/23/2018  3:53 PM       Failed - No positive pregnancy test in past 12 months       Passed - LDL on file in past 12 months    Recent Labs   Lab Test  10/23/17   1037   LDL  107*            Passed - No abnormal creatine kinase in past 12 months    No lab results found.         Passed - Recent or future visit with authorizing provider    Patient had office visit in the last year or has a visit in the next 30 days with authorizing provider.  See \"Patient Info\" tab in inbasket, or \"Choose Columns\" in Meds & Orders section of the refill encounter.            Passed - Patient is age 18 or older       Passed - No active pregnancy on record        Horacio POWERS (R)    "

## 2018-03-15 ENCOUNTER — TELEPHONE (OUTPATIENT)
Dept: FAMILY MEDICINE | Facility: CLINIC | Age: 63
End: 2018-03-15

## 2018-05-22 DIAGNOSIS — E87.6 HYPOKALEMIA: ICD-10-CM

## 2018-05-22 LAB
ANION GAP SERPL CALCULATED.3IONS-SCNC: 6 MMOL/L (ref 3–14)
BUN SERPL-MCNC: 6 MG/DL (ref 7–30)
CALCIUM SERPL-MCNC: 9 MG/DL (ref 8.5–10.1)
CHLORIDE SERPL-SCNC: 98 MMOL/L (ref 94–109)
CO2 SERPL-SCNC: 32 MMOL/L (ref 20–32)
CREAT SERPL-MCNC: 0.5 MG/DL (ref 0.52–1.04)
GFR SERPL CREATININE-BSD FRML MDRD: >90 ML/MIN/1.7M2
GLUCOSE SERPL-MCNC: 99 MG/DL (ref 70–99)
POTASSIUM SERPL-SCNC: 3.1 MMOL/L (ref 3.4–5.3)
SODIUM SERPL-SCNC: 136 MMOL/L (ref 133–144)

## 2018-05-22 PROCEDURE — 36415 COLL VENOUS BLD VENIPUNCTURE: CPT | Performed by: NURSE PRACTITIONER

## 2018-05-22 PROCEDURE — 80048 BASIC METABOLIC PNL TOTAL CA: CPT | Performed by: NURSE PRACTITIONER

## 2018-10-24 ENCOUNTER — TELEPHONE (OUTPATIENT)
Dept: FAMILY MEDICINE | Facility: CLINIC | Age: 63
End: 2018-10-24

## 2018-10-24 NOTE — TELEPHONE ENCOUNTER
Pt called back and she will complete the FIT. She states she will do her mammogram next month.    Purvi Simental MA

## 2018-10-24 NOTE — TELEPHONE ENCOUNTER
Panel Management Review      Patient has the following on her problem list:     Hypertension   Last three blood pressure readings:  BP Readings from Last 3 Encounters:   10/24/17 131/77   08/14/17 114/68   06/14/16 123/82     Blood pressure: Passed    HTN Guidelines:  Age 18-59 BP range:  Less than 140/90  Age 60-85 with Diabetes:  Less than 140/90  Age 60-85 without Diabetes:  less than 150/90      Composite cancer screening  Chart review shows that this patient is due/due soon for the following Mammogram and Fecal Colorectal (FIT)  Summary:    Patient is due/failing the following:   FIT and MAMMOGRAM    Action needed:   Colon cancer screening and mammogram.    Type of outreach:    Phone, left message for patient to call back.     Questions for provider review:    None                                                                                                                                    Purvi Simental MA

## 2019-01-07 DIAGNOSIS — I10 ESSENTIAL HYPERTENSION, BENIGN: ICD-10-CM

## 2019-01-07 RX ORDER — HYDROCHLOROTHIAZIDE 25 MG/1
TABLET ORAL
Qty: 90 TABLET | Refills: 3 | Status: CANCELLED | OUTPATIENT
Start: 2019-01-07

## 2019-01-07 RX ORDER — AMLODIPINE BESYLATE 5 MG/1
5 TABLET ORAL DAILY
Qty: 90 TABLET | Refills: 3 | Status: CANCELLED | OUTPATIENT
Start: 2019-01-07

## 2019-01-08 RX ORDER — METOPROLOL TARTRATE 50 MG
TABLET ORAL
Qty: 7 TABLET | Refills: 0 | Status: SHIPPED | OUTPATIENT
Start: 2019-01-08 | End: 2019-01-14

## 2019-01-08 NOTE — TELEPHONE ENCOUNTER
Patient reports she does forget to take her medication at times. Patient was scheduled.    Gracia DALY RN

## 2019-01-14 ENCOUNTER — OFFICE VISIT (OUTPATIENT)
Dept: FAMILY MEDICINE | Facility: CLINIC | Age: 64
End: 2019-01-14
Payer: COMMERCIAL

## 2019-01-14 VITALS
TEMPERATURE: 97.4 F | HEIGHT: 63 IN | DIASTOLIC BLOOD PRESSURE: 80 MMHG | HEART RATE: 92 BPM | WEIGHT: 150 LBS | RESPIRATION RATE: 16 BRPM | OXYGEN SATURATION: 98 % | SYSTOLIC BLOOD PRESSURE: 122 MMHG | BODY MASS INDEX: 26.58 KG/M2

## 2019-01-14 DIAGNOSIS — Z12.11 COLON CANCER SCREENING: Primary | ICD-10-CM

## 2019-01-14 DIAGNOSIS — I10 ESSENTIAL HYPERTENSION, BENIGN: ICD-10-CM

## 2019-01-14 DIAGNOSIS — R53.83 OTHER FATIGUE: ICD-10-CM

## 2019-01-14 DIAGNOSIS — F17.200 TOBACCO USE DISORDER: ICD-10-CM

## 2019-01-14 DIAGNOSIS — E78.5 HYPERLIPIDEMIA LDL GOAL <130: ICD-10-CM

## 2019-01-14 LAB
BASOPHILS # BLD AUTO: 0.1 10E9/L (ref 0–0.2)
BASOPHILS NFR BLD AUTO: 0.5 %
DIFFERENTIAL METHOD BLD: ABNORMAL
EOSINOPHIL # BLD AUTO: 0.1 10E9/L (ref 0–0.7)
EOSINOPHIL NFR BLD AUTO: 1.1 %
ERYTHROCYTE [DISTWIDTH] IN BLOOD BY AUTOMATED COUNT: 13 % (ref 10–15)
HCT VFR BLD AUTO: 48.5 % (ref 35–47)
HGB BLD-MCNC: 16.9 G/DL (ref 11.7–15.7)
LYMPHOCYTES # BLD AUTO: 1.7 10E9/L (ref 0.8–5.3)
LYMPHOCYTES NFR BLD AUTO: 15.7 %
MCH RBC QN AUTO: 30.2 PG (ref 26.5–33)
MCHC RBC AUTO-ENTMCNC: 34.8 G/DL (ref 31.5–36.5)
MCV RBC AUTO: 87 FL (ref 78–100)
MONOCYTES # BLD AUTO: 1.3 10E9/L (ref 0–1.3)
MONOCYTES NFR BLD AUTO: 11.4 %
NEUTROPHILS # BLD AUTO: 7.9 10E9/L (ref 1.6–8.3)
NEUTROPHILS NFR BLD AUTO: 71.3 %
PLATELET # BLD AUTO: 287 10E9/L (ref 150–450)
RBC # BLD AUTO: 5.6 10E12/L (ref 3.8–5.2)
WBC # BLD AUTO: 11 10E9/L (ref 4–11)

## 2019-01-14 PROCEDURE — 80053 COMPREHEN METABOLIC PANEL: CPT | Performed by: NURSE PRACTITIONER

## 2019-01-14 PROCEDURE — 36415 COLL VENOUS BLD VENIPUNCTURE: CPT | Performed by: NURSE PRACTITIONER

## 2019-01-14 PROCEDURE — 84443 ASSAY THYROID STIM HORMONE: CPT | Performed by: NURSE PRACTITIONER

## 2019-01-14 PROCEDURE — 99214 OFFICE O/P EST MOD 30 MIN: CPT | Performed by: NURSE PRACTITIONER

## 2019-01-14 PROCEDURE — 85025 COMPLETE CBC W/AUTO DIFF WBC: CPT | Performed by: NURSE PRACTITIONER

## 2019-01-14 RX ORDER — HYDROCHLOROTHIAZIDE 25 MG/1
TABLET ORAL
Qty: 90 TABLET | Refills: 3 | Status: ON HOLD | OUTPATIENT
Start: 2019-01-14 | End: 2022-07-08

## 2019-01-14 RX ORDER — AMLODIPINE BESYLATE 5 MG/1
5 TABLET ORAL DAILY
Qty: 90 TABLET | Refills: 3 | Status: SHIPPED | OUTPATIENT
Start: 2019-01-14

## 2019-01-14 RX ORDER — METOPROLOL TARTRATE 50 MG
TABLET ORAL
Qty: 7 TABLET | Refills: 0 | Status: SHIPPED | OUTPATIENT
Start: 2019-01-14 | End: 2019-01-23

## 2019-01-14 RX ORDER — PRAVASTATIN SODIUM 80 MG/1
TABLET ORAL
Qty: 90 TABLET | Refills: 3 | Status: SHIPPED | OUTPATIENT
Start: 2019-01-14

## 2019-01-14 RX ORDER — POTASSIUM CHLORIDE 1.5 G/1.58G
20 POWDER, FOR SOLUTION ORAL 2 TIMES DAILY
Qty: 180 PACKET | Refills: 3 | Status: SHIPPED | OUTPATIENT
Start: 2019-01-14 | End: 2020-01-14

## 2019-01-14 ASSESSMENT — MIFFLIN-ST. JEOR: SCORE: 1204.53

## 2019-01-15 ENCOUNTER — HOSPITAL ENCOUNTER (EMERGENCY)
Facility: CLINIC | Age: 64
Discharge: HOME | End: 2019-01-15
Attending: EMERGENCY MEDICINE | Admitting: EMERGENCY MEDICINE
Payer: COMMERCIAL

## 2019-01-15 ENCOUNTER — TELEPHONE (OUTPATIENT)
Dept: FAMILY MEDICINE | Facility: CLINIC | Age: 64
End: 2019-01-15

## 2019-01-15 VITALS
WEIGHT: 150 LBS | TEMPERATURE: 98.4 F | SYSTOLIC BLOOD PRESSURE: 111 MMHG | HEIGHT: 63 IN | OXYGEN SATURATION: 95 % | HEART RATE: 79 BPM | BODY MASS INDEX: 26.58 KG/M2 | DIASTOLIC BLOOD PRESSURE: 68 MMHG

## 2019-01-15 DIAGNOSIS — E87.6 HYPOKALEMIA: ICD-10-CM

## 2019-01-15 LAB
ALBUMIN SERPL-MCNC: 4.1 G/DL (ref 3.4–5)
ALP SERPL-CCNC: 119 U/L (ref 40–150)
ALT SERPL W P-5'-P-CCNC: 30 U/L (ref 0–50)
ANION GAP SERPL CALCULATED.3IONS-SCNC: 7 MMOL/L (ref 3–14)
ANION GAP SERPL CALCULATED.3IONS-SCNC: 7 MMOL/L (ref 3–14)
AST SERPL W P-5'-P-CCNC: 16 U/L (ref 0–45)
BILIRUB SERPL-MCNC: 0.3 MG/DL (ref 0.2–1.3)
BUN SERPL-MCNC: 8 MG/DL (ref 7–30)
BUN SERPL-MCNC: 8 MG/DL (ref 7–30)
CALCIUM SERPL-MCNC: 8.4 MG/DL (ref 8.5–10.1)
CALCIUM SERPL-MCNC: 9.2 MG/DL (ref 8.5–10.1)
CHLORIDE SERPL-SCNC: 95 MMOL/L (ref 94–109)
CHLORIDE SERPL-SCNC: 97 MMOL/L (ref 94–109)
CO2 SERPL-SCNC: 32 MMOL/L (ref 20–32)
CO2 SERPL-SCNC: 35 MMOL/L (ref 20–32)
CREAT SERPL-MCNC: 0.64 MG/DL (ref 0.52–1.04)
CREAT SERPL-MCNC: 0.65 MG/DL (ref 0.52–1.04)
GFR SERPL CREATININE-BSD FRML MDRD: >90 ML/MIN/{1.73_M2}
GFR SERPL CREATININE-BSD FRML MDRD: >90 ML/MIN/{1.73_M2}
GLUCOSE SERPL-MCNC: 101 MG/DL (ref 70–99)
GLUCOSE SERPL-MCNC: 137 MG/DL (ref 70–99)
POTASSIUM SERPL-SCNC: 2.6 MMOL/L (ref 3.4–5.3)
POTASSIUM SERPL-SCNC: 2.7 MMOL/L (ref 3.4–5.3)
PROT SERPL-MCNC: 7.9 G/DL (ref 6.8–8.8)
SODIUM SERPL-SCNC: 134 MMOL/L (ref 133–144)
SODIUM SERPL-SCNC: 139 MMOL/L (ref 133–144)
TSH SERPL DL<=0.005 MIU/L-ACNC: 1.31 MU/L (ref 0.4–4)

## 2019-01-15 PROCEDURE — 25000128 H RX IP 250 OP 636: Performed by: EMERGENCY MEDICINE

## 2019-01-15 PROCEDURE — 93005 ELECTROCARDIOGRAM TRACING: CPT | Performed by: EMERGENCY MEDICINE

## 2019-01-15 PROCEDURE — 99283 EMERGENCY DEPT VISIT LOW MDM: CPT | Mod: Z6 | Performed by: EMERGENCY MEDICINE

## 2019-01-15 PROCEDURE — 25000132 ZZH RX MED GY IP 250 OP 250 PS 637: Performed by: EMERGENCY MEDICINE

## 2019-01-15 PROCEDURE — 80048 BASIC METABOLIC PNL TOTAL CA: CPT | Performed by: EMERGENCY MEDICINE

## 2019-01-15 PROCEDURE — 96365 THER/PROPH/DIAG IV INF INIT: CPT | Performed by: EMERGENCY MEDICINE

## 2019-01-15 PROCEDURE — 99284 EMERGENCY DEPT VISIT MOD MDM: CPT | Mod: 25 | Performed by: EMERGENCY MEDICINE

## 2019-01-15 RX ORDER — POTASSIUM CHLORIDE 1.5 G/1.58G
40 POWDER, FOR SOLUTION ORAL 2 TIMES DAILY
Status: DISCONTINUED | OUTPATIENT
Start: 2019-01-15 | End: 2019-01-15 | Stop reason: HOSPADM

## 2019-01-15 RX ORDER — POTASSIUM CHLORIDE 1.5 G/1.58G
40 POWDER, FOR SOLUTION ORAL 2 TIMES DAILY
Qty: 20 PACKET | Refills: 0 | Status: SHIPPED | OUTPATIENT
Start: 2019-01-15 | End: 2019-01-20

## 2019-01-15 RX ORDER — POTASSIUM CL/LIDO/0.9 % NACL 10MEQ/0.1L
10 INTRAVENOUS SOLUTION, PIGGYBACK (ML) INTRAVENOUS ONCE
Status: COMPLETED | OUTPATIENT
Start: 2019-01-15 | End: 2019-01-15

## 2019-01-15 RX ADMIN — POTASSIUM CHLORIDE 40 MEQ: 1.5 POWDER, FOR SOLUTION ORAL at 18:55

## 2019-01-15 RX ADMIN — Medication 10 MEQ: at 18:54

## 2019-01-15 ASSESSMENT — MIFFLIN-ST. JEOR: SCORE: 1204.53

## 2019-01-15 NOTE — TELEPHONE ENCOUNTER
Pt with low Potassium.  Potassium is 2.6.  SYLVIA Bob notified.  KPavelRN  
See result note.  She needs to be evaluated in ED.  DANIELLE Faith      
Spoke with pt and she will go to the ER now for her low K+. Silvestre  
2

## 2019-01-15 NOTE — PROGRESS NOTES
SUBJECTIVE:   Katherine Love is a 63 year old female who presents to clinic today for the following health issues:      Hyperlipidemia Follow-Up      Rate your low fat/cholesterol diet?: fair    Taking statin?  Yes, no muscle aches from statin    Other lipid medications/supplements?:  none    Hypertension Follow-up      Outpatient blood pressures are not being checked.    Low Salt Diet: not monitoring salt      Amount of exercise or physical activity: None    Problems taking medications regularly: not taking the potasium and will forget to take medications occassional.    Medication side effects: none    Diet: regular (no restrictions)        Fatigue and lumps on her face.    Problem list and histories reviewed & adjusted, as indicated.  Additional history: she is generally feeling well. Needs labs and refills.  Has some skin tags on her face.  Has been fatigued. She had low potassium and couldn't tolerate taking the oral potassium medication.      Patient Active Problem List   Diagnosis     Mitral valve disorder     Essential hypertension, benign     Paroxysmal supraventricular tachycardia (H)     Tobacco use disorder     Degeneration of lumbar or lumbosacral intervertebral disc     S/P hysterectomy     Hyperlipidemia LDL goal <130     Medically noncompliant     Past Surgical History:   Procedure Laterality Date     C LIGATE FALLOPIAN TUBE,POSTPARTUM       C PART REMOVAL COLON W ANASTOMOSIS      meckels diverticulum     HC REPAIR UMBILICAL RAMA,5+Y/O,REDUC       HC TREATMENT MISSED  SURG, 1ST TRIMESTER       HYSTERECTOMY, PAP NO LONGER INDICATED  05       Social History     Tobacco Use     Smoking status: Current Some Day Smoker     Packs/day: 0.20     Years: 41.00     Pack years: 8.20     Smokeless tobacco: Never Used     Tobacco comment: 1 pack per week   Substance Use Topics     Alcohol use: Yes     Alcohol/week: 7.2 oz     Types: 12 Cans of beer per week     Comment: occasional      Family History   Problem Relation Age of Onset     Arthritis Mother      Neurologic Disorder Mother         epilepsy     C.A.D. Father          at 72 of [place]         Current Outpatient Medications   Medication Sig Dispense Refill     amLODIPine (NORVASC) 5 MG tablet Take 1 tablet (5 mg) by mouth daily 90 tablet 3     hydrochlorothiazide (HYDRODIURIL) 25 MG tablet Take one by mouth daily 90 tablet 3     metoprolol tartrate (LOPRESSOR) 50 MG tablet TAKE ONE TABLET BY MOUTH EVERY NIGHT AT BEDTIME 7 tablet 0     potassium chloride (KLOR-CON) 20 MEQ packet Take 20 mEq by mouth 2 times daily 180 packet 3     pravastatin (PRAVACHOL) 80 MG tablet Take one by mouth daily. 90 tablet 3     potassium chloride SA (KLOR-CON) 20 MEQ CR tablet Take 1 tablet (20 mEq) by mouth daily (Patient not taking: Reported on 2019) 30 tablet 1     No Known Allergies  Recent Labs   Lab Test 18  1050 10/23/17  1037 14  0955  06/26/13  05/21/12  11/12/10   LDL  --  107* 139*  --  142  --  112   < > 168*   HDL  --  48* 49*  --  47  --  47   < > 40   TRIG  --  198* 196*  --  154  --  187   < > 213*   ALT  --   --  34  --  40  --  33   < >  --    CR 0.50* 0.62 0.66  --  0.8  --  0.7   < >  --    GFRESTIMATED >90 >90 >90  Non African American GFR Calc    --  >60   < >  --   --   --    GFRESTBLACK >90 >90 >90  African American GFR Calc     < >  --   --   --   --   --    POTASSIUM 3.1* 2.9* 4.1  --  4.6  --  3.5   < >  --    TSH  --   --   --   --   --   --  3.20  --  1.82    < > = values in this interval not displayed.      BP Readings from Last 3 Encounters:   19 122/80   10/24/17 131/77   17 114/68    Wt Readings from Last 3 Encounters:   19 68 kg (150 lb)   10/24/17 71.2 kg (157 lb)   17 71.8 kg (158 lb 6.4 oz)                    Reviewed and updated as needed this visit by clinical staff  Tobacco  Allergies  Meds  Med Hx  Surg Hx  Fam Hx  Soc Hx      Reviewed and updated as needed this visit by  "Provider          ROS: 10 point ROS neg other than the symptoms noted above in the HPI.    OBJECTIVE:     /80   Pulse 92   Temp 97.4  F (36.3  C) (Oral)   Resp 16   Ht 1.6 m (5' 3\")   Wt 68 kg (150 lb)   LMP 05/08/2005   SpO2 98%   BMI 26.57 kg/m    Body mass index is 26.57 kg/m .  GENERAL: healthy, alert and no distress  HENT: ear canals and TM's normal, pharynx with mild erythema  NECK: no adenopathy, no asymmetry  RESP: lungs clear to auscultation - no rales, rhonchi or wheezes  CV: regular rate and rhythm, normal S1 S2, no S3 or S4, no murmur  ABDOMEN: soft, nontender, no hepatosplenomegaly, no masses and bowel sounds normal  MS: no gross musculoskeletal defects noted      Diagnostic Test Results:  Results for orders placed or performed in visit on 01/14/19 (from the past 24 hour(s))   CBC with platelets differential   Result Value Ref Range    WBC 11.0 4.0 - 11.0 10e9/L    RBC Count 5.60 (H) 3.8 - 5.2 10e12/L    Hemoglobin 16.9 (H) 11.7 - 15.7 g/dL    Hematocrit 48.5 (H) 35.0 - 47.0 %    MCV 87 78 - 100 fl    MCH 30.2 26.5 - 33.0 pg    MCHC 34.8 31.5 - 36.5 g/dL    RDW 13.0 10.0 - 15.0 %    Platelet Count 287 150 - 450 10e9/L    % Neutrophils 71.3 %    % Lymphocytes 15.7 %    % Monocytes 11.4 %    % Eosinophils 1.1 %    % Basophils 0.5 %    Absolute Neutrophil 7.9 1.6 - 8.3 10e9/L    Absolute Lymphocytes 1.7 0.8 - 5.3 10e9/L    Absolute Monocytes 1.3 0.0 - 1.3 10e9/L    Absolute Eosinophils 0.1 0.0 - 0.7 10e9/L    Absolute Basophils 0.1 0.0 - 0.2 10e9/L    Diff Method Automated Method        ASSESSMENT/PLAN:             1. Colon cancer screening    - Fecal colorectal cancer screen (FIT); Future    2. Hyperlipidemia LDL goal <130    - pravastatin (PRAVACHOL) 80 MG tablet; Take one by mouth daily.  Dispense: 90 tablet; Refill: 3  - Comprehensive metabolic panel    3. Essential hypertension, benign    - potassium chloride (KLOR-CON) 20 MEQ packet; Take 20 mEq by mouth 2 times daily  Dispense: 180 " packet; Refill: 3  - amLODIPine (NORVASC) 5 MG tablet; Take 1 tablet (5 mg) by mouth daily  Dispense: 90 tablet; Refill: 3  - hydrochlorothiazide (HYDRODIURIL) 25 MG tablet; Take one by mouth daily  Dispense: 90 tablet; Refill: 3  - metoprolol tartrate (LOPRESSOR) 50 MG tablet; TAKE ONE TABLET BY MOUTH EVERY NIGHT AT BEDTIME  Dispense: 7 tablet; Refill: 0  - TSH with free T4 reflex    4. BENIGN HYPERTENSION    - potassium chloride (KLOR-CON) 20 MEQ packet; Take 20 mEq by mouth 2 times daily  Dispense: 180 packet; Refill: 3  - amLODIPine (NORVASC) 5 MG tablet; Take 1 tablet (5 mg) by mouth daily  Dispense: 90 tablet; Refill: 3  - hydrochlorothiazide (HYDRODIURIL) 25 MG tablet; Take one by mouth daily  Dispense: 90 tablet; Refill: 3  - metoprolol tartrate (LOPRESSOR) 50 MG tablet; TAKE ONE TABLET BY MOUTH EVERY NIGHT AT BEDTIME  Dispense: 7 tablet; Refill: 0  - TSH with free T4 reflex    5. Other fatigue    - CBC with platelets differential    6. Tobacco use disorder    No motivation to quit smoking at this time.      See Patient Instructions  Patient Instructions   Will be notified of pending labs.  Continue with all medications.  Continue with all efforts at smoking cessation.  Resume potassium daily.  Mammogram scheduled.  Do FIT kit.        Thank you for choosing [facility].  You may be receiving a survey in the mail from [employer] regarding your visit today.  Please take a few minutes to complete and return the survey to let us know how we are doing.      Our Clinic hours are:  Mondays    7:20 am - 7 pm  Tues -  Fri  7:20 am - 5 pm    Clinic Phone: [phone]    The clinic lab opens at 7:30 am Mon - Fri and appointments are required.    [facility]  Ph. [phone]  Monday  8 am - 7pm  Tues - Fri 8 am - 5:30 pm             HARRIET Montez CNP  [facility] [place]

## 2019-01-15 NOTE — PATIENT INSTRUCTIONS
Will be notified of pending labs.  Continue with all medications.  Continue with all efforts at smoking cessation.  Resume potassium daily.  Mammogram scheduled.  Do FIT kit.        Thank you for choosing [facility].  You may be receiving a survey in the mail from [employer] regarding your visit today.  Please take a few minutes to complete and return the survey to let us know how we are doing.      Our Clinic hours are:  Mondays    7:20 am - 7 pm  Tues -  Fri  7:20 am - 5 pm    Clinic Phone: [phone]    The clinic lab opens at 7:30 am Mon - Fri and appointments are required.    [facility]  Ph. [phone]  Monday  8 am - 7pm  Tues - Fri 8 am - 5:30 pm

## 2019-01-15 NOTE — ED AVS SNAPSHOT
[facility] Emergency Department  [address] [address]  Phone:  [phone]  Fax:  [phone]                                    Katherine Love   MRN: [id number]    Department:  [facility] Emergency Department   Date of Visit:  1/15/2019           After Visit Summary Signature Page    I have received my discharge instructions, and my questions have been answered. I have discussed any challenges I see with this plan with the nurse or doctor.    ..........................................................................................................................................  Patient/Patient Representative Signature      ..........................................................................................................................................  Patient Representative Print Name and Relationship to Patient    ..................................................               ................................................  Date                                   Time    ..........................................................................................................................................  Reviewed by Signature/Title    ...................................................              ..............................................  Date                                               Time          22EPIC Rev 08/18

## 2019-01-16 NOTE — DISCHARGE INSTRUCTIONS
Discontinue hydrochlorothiazide, take potassium 40 mEq daily for the next 7 days, follow-up primary care for recheck blood pressure and recheck potassium 1 week.  Return here for any concerns.

## 2019-01-16 NOTE — ED PROVIDER NOTES
History     Chief Complaint   Patient presents with     Abnormal Labs     low potassium, checked yesterday     HPI  Katherine Love is a 63 year old female who has a history of hypertension, mitral valve disorder, paroxysmal SVT, hyperlipidemia, and hypokalemia who presents to the ED for evaluation of abnormal labs. Patient recently had labs done yesterday in clinic when she was seen for routine follow up and colon screening. She was seen by Luci LARIOS CNP. Patient reports that she mentioned feeling fatigued while in clinic yesterday. She had comprehensive metabolic panel, TSH with free T4 reflex, and CBC with platelets differential. She had low potassium at 2.6.    Patient had low potassium in May 2018 and October 2017. She was previously on supplements for hypokalemia however she stopped taking these because the pills were too large. Her only complaint here in the ED is that she has felt fatigued recently.     Problem List:    Patient Active Problem List    Diagnosis Date Noted     Medically noncompliant 06/26/2013     Priority: Medium     With follow up due to lack of insurance       Hyperlipidemia LDL goal <130 09/12/2011     Priority: Medium     S/P hysterectomy 05/04/2011     Priority: Medium     Degeneration of lumbar or lumbosacral intervertebral disc 11/15/2007     Priority: Medium     Tobacco use disorder 11/07/2007     Priority: Medium     Paroxysmal supraventricular tachycardia (H) 03/02/2007     Priority: Medium     Holter on Feb 2007       Mitral valve disorder 05/17/2005     Priority: Medium     trace eccentric MR, nl LVEF (65%)    2/07 echocardiogram  mild thickening of the anterior mitral leaflet, but no support on   this study for mitral valve prolapse.  There is trace MR.  There is   mild pulmonary hypertension, which on the last study was borderline   at 26 mmHg.  No intracardiac shunt was noted on either study.       Problem list name updated by automated process. Provider to review        Essential hypertension, benign 2005     Priority: Medium     Cough from lisinopril  Losartan - had rash  When on it but may             Past Medical History:    Past Medical History:   Diagnosis Date     Acute sinusitis, unspecified      Anemia, unspecified      Bronchitis, not specified as acute or chronic      Excessive or frequent menstruation      Intramural leiomyoma of uterus      Other disorder of menstruation and other abnormal bleeding from female genital tract      TOBACCO ABUSE-CONTINUOUS      Unspecified otitis media        Past Surgical History:    Past Surgical History:   Procedure Laterality Date     C LIGATE FALLOPIAN TUBE,POSTPARTUM       C PART REMOVAL COLON W ANASTOMOSIS      meckels diverticulum     HC REPAIR UMBILICAL RAMA,5+Y/O,REDUC       HC TREATMENT MISSED  SURG, 1ST TRIMESTER       HYSTERECTOMY, PAP NO LONGER INDICATED  05       Family History:    Family History   Problem Relation Age of Onset     Arthritis Mother      Neurologic Disorder Mother         epilepsy     C.A.D. Father          at 72 of [place]       Social History:  Marital Status:   [2]  Social History     Tobacco Use     Smoking status: Current Some Day Smoker     Packs/day: 0.20     Years: 41.00     Pack years: 8.20     Smokeless tobacco: Never Used     Tobacco comment: 1 pack per week   Substance Use Topics     Alcohol use: Yes     Alcohol/week: 7.2 oz     Types: 12 Cans of beer per week     Comment: occasional     Drug use: No        Medications:      amLODIPine (NORVASC) 5 MG tablet   hydrochlorothiazide (HYDRODIURIL) 25 MG tablet   metoprolol tartrate (LOPRESSOR) 50 MG tablet   potassium chloride (KLOR-CON) 20 MEQ packet   pravastatin (PRAVACHOL) 80 MG tablet   potassium chloride (KLOR-CON) 20 MEQ packet         Review of Systems    All other systems are reviewed and are negative.    Physical Exam   BP: (!) 151/92  Pulse: 92  Heart Rate: 109  Temp: 98.4  F (36.9  C)  Height: 160 cm (5'  "3\")  Weight: 68 kg (150 lb)  SpO2: 95 %      Physical Exam  Nontoxic appearing no respiratory distress alert and oriented ×3  Head atraumatic normocephalic  Neck supple full active painless range of motion  Lungs clear to auscultation  Heart regular no murmur  Strength and sensation grossly intact throughout the extremities, gait and station normal  Speech is fluent, good eye contact, thought processes are rational      ED Course        Procedures               Critical Care time:  none               Results for orders placed or performed during the hospital encounter of 01/15/19 (from the past 24 hour(s))   Basic metabolic panel   Result Value Ref Range    Sodium 139 133 - 144 mmol/L    Potassium 2.7 (L) 3.4 - 5.3 mmol/L    Chloride 97 94 - 109 mmol/L    Carbon Dioxide 35 (H) 20 - 32 mmol/L    Anion Gap 7 3 - 14 mmol/L    Glucose 137 (H) 70 - 99 mg/dL    Urea Nitrogen 8 7 - 30 mg/dL    Creatinine 0.65 0.52 - 1.04 mg/dL    GFR Estimate >90 >60 mL/min/[1.73_m2]    GFR Estimate If Black >90 >60 mL/min/[1.73_m2]    Calcium 8.4 (L) 8.5 - 10.1 mg/dL       Medications   potassium chloride (KLOR-CON) Packet 40 mEq (40 mEq Oral Given 1/15/19 1855)   potassium chloride 10 mEq in 100 mL intermittent infusion with 10 mg lidocaine (0 mEq Intravenous Stopped 1/15/19 2001)       6:17 PM Patient assessed.      Assessments & Plan (with Medical Decision Making)  63-year-old female with hypertension on hydrochlorothiazide chronic hypokalemia presents after having her potassium checked in clinic and found to be hypokalemic with potassium of 2.6, rechecked here 2.7.  1 IV dose 10 mEq, 40 mEq oral.  Recommend ongoing potassium at 40 mEq twice daily for 5 days.  Discontinue hydrochlorothiazide.  Follow-up early next week primary care for recheck blood pressure and management of antihypertensives.  Return criteria reviewed.     I have reviewed the nursing notes.    I have reviewed the findings, diagnosis, plan and need for follow up with " the patient.          Medication List      Modified    * potassium chloride 20 MEQ packet  Commonly known as:  KLOR-CON  20 mEq, Oral, 2 TIMES DAILY  What changed:  Another medication with the same name was added. Make sure you understand how and when to take each.     * potassium chloride 20 MEQ packet  Commonly known as:  KLOR-CON  40 mEq, Oral, 2 TIMES DAILY  What changed:  You were already taking a medication with the same name, and this prescription was added. Make sure you understand how and when to take each.         * This list has 2 medication(s) that are the same as other medications prescribed for you. Read the directions carefully, and ask your doctor or other care provider to review them with you.                Final diagnoses:   Hypokalemia     This document serves as a record of the services and decisions personally performed and made by Jus Wong MD. It was created on HIS/HER behalf by   Farida Plunkett, a trained medical scribe. The creation of this document is based the provider's statements to the medical scribe.  Farida Plunkett 6:16 PM 1/15/2019    Provider:   The information in this document, created by the medical scribe for me, accurately reflects the services I personally performed and the decisions made by me. I have reviewed and approved this document for accuracy prior to leaving the patient care area.  Jus Wong MD 6:16 PM 1/15/2019    1/15/2019   [facility] EMERGENCY DEPARTMENT     Jus Wong MD  01/15/19 3651

## 2019-01-23 ENCOUNTER — OFFICE VISIT (OUTPATIENT)
Dept: FAMILY MEDICINE | Facility: CLINIC | Age: 64
End: 2019-01-23
Payer: COMMERCIAL

## 2019-01-23 ENCOUNTER — ANCILLARY PROCEDURE (OUTPATIENT)
Dept: MAMMOGRAPHY | Facility: CLINIC | Age: 64
End: 2019-01-23
Payer: COMMERCIAL

## 2019-01-23 VITALS
RESPIRATION RATE: 16 BRPM | WEIGHT: 154 LBS | HEIGHT: 63 IN | BODY MASS INDEX: 27.29 KG/M2 | OXYGEN SATURATION: 94 % | SYSTOLIC BLOOD PRESSURE: 110 MMHG | TEMPERATURE: 98.2 F | HEART RATE: 59 BPM | DIASTOLIC BLOOD PRESSURE: 70 MMHG

## 2019-01-23 DIAGNOSIS — Z12.31 VISIT FOR SCREENING MAMMOGRAM: ICD-10-CM

## 2019-01-23 DIAGNOSIS — I10 ESSENTIAL HYPERTENSION, BENIGN: Primary | ICD-10-CM

## 2019-01-23 DIAGNOSIS — Z23 NEED FOR VACCINATION: ICD-10-CM

## 2019-01-23 DIAGNOSIS — I10 ESSENTIAL HYPERTENSION, BENIGN: ICD-10-CM

## 2019-01-23 DIAGNOSIS — E87.6 HYPOKALEMIA: ICD-10-CM

## 2019-01-23 LAB
ANION GAP SERPL CALCULATED.3IONS-SCNC: 4 MMOL/L (ref 3–14)
BUN SERPL-MCNC: 9 MG/DL (ref 7–30)
CALCIUM SERPL-MCNC: 8.8 MG/DL (ref 8.5–10.1)
CHLORIDE SERPL-SCNC: 108 MMOL/L (ref 94–109)
CO2 SERPL-SCNC: 25 MMOL/L (ref 20–32)
CREAT SERPL-MCNC: 0.5 MG/DL (ref 0.52–1.04)
GFR SERPL CREATININE-BSD FRML MDRD: >90 ML/MIN/{1.73_M2}
GLUCOSE SERPL-MCNC: 97 MG/DL (ref 70–99)
POTASSIUM SERPL-SCNC: 4.1 MMOL/L (ref 3.4–5.3)
SODIUM SERPL-SCNC: 137 MMOL/L (ref 133–144)

## 2019-01-23 PROCEDURE — 77067 SCR MAMMO BI INCL CAD: CPT | Mod: TC

## 2019-01-23 PROCEDURE — 90715 TDAP VACCINE 7 YRS/> IM: CPT | Performed by: NURSE PRACTITIONER

## 2019-01-23 PROCEDURE — 90471 IMMUNIZATION ADMIN: CPT | Performed by: NURSE PRACTITIONER

## 2019-01-23 PROCEDURE — 80048 BASIC METABOLIC PNL TOTAL CA: CPT | Performed by: NURSE PRACTITIONER

## 2019-01-23 PROCEDURE — 99214 OFFICE O/P EST MOD 30 MIN: CPT | Mod: 25 | Performed by: NURSE PRACTITIONER

## 2019-01-23 PROCEDURE — 36415 COLL VENOUS BLD VENIPUNCTURE: CPT | Performed by: NURSE PRACTITIONER

## 2019-01-23 RX ORDER — METOPROLOL TARTRATE 50 MG
TABLET ORAL
Qty: 90 TABLET | Refills: 3 | Status: SHIPPED | OUTPATIENT
Start: 2019-01-23

## 2019-01-23 ASSESSMENT — MIFFLIN-ST. JEOR: SCORE: 1222.67

## 2019-01-23 NOTE — PROGRESS NOTES
SUBJECTIVE:   Katherine Love is a 63 year old female who presents to clinic today for the following health issues:  not on the HCTZ    ED/UC Followup:    Facility:  [facility]  Date of visit: 1/15/2019  Reason for visit: Hypokalemia  Current Status: just a hair less tired.             Problem list and histories reviewed & adjusted, as indicated.  Additional history: she had an elevated potassium level when she was last in. She states she did stop her supplements because they were too hard to swallow. She is feeling ok now. She has stopped hydrochlorothiazide and blood pressure remains controlled. She received IV and oral potassium in ED. She is presently on 20 meq twice daily of potassium, packets.    Patient Active Problem List   Diagnosis     Mitral valve disorder     Essential hypertension, benign     Paroxysmal supraventricular tachycardia (H)     Tobacco use disorder     Degeneration of lumbar or lumbosacral intervertebral disc     S/P hysterectomy     Hyperlipidemia LDL goal <130     Medically noncompliant     Past Surgical History:   Procedure Laterality Date     C LIGATE FALLOPIAN TUBE,POSTPARTUM       C PART REMOVAL COLON W ANASTOMOSIS      meckels diverticulum     HC REPAIR UMBILICAL RAMA,5+Y/O,REDUC       HC TREATMENT MISSED  SURG, 1ST TRIMESTER       HYSTERECTOMY, PAP NO LONGER INDICATED  05       Social History     Tobacco Use     Smoking status: Current Some Day Smoker     Packs/day: 0.20     Years: 41.00     Pack years: 8.20     Smokeless tobacco: Never Used     Tobacco comment: 1 pack per week   Substance Use Topics     Alcohol use: Yes     Alcohol/week: 7.2 oz     Types: 12 Cans of beer per week     Comment: occasional     Family History   Problem Relation Age of Onset     Arthritis Mother      Neurologic Disorder Mother         epilepsy     C.A.D. Father          at 72 of [place]         Current Outpatient Medications   Medication Sig Dispense Refill      amLODIPine (NORVASC) 5 MG tablet Take 1 tablet (5 mg) by mouth daily 90 tablet 3     metoprolol tartrate (LOPRESSOR) 50 MG tablet TAKE ONE TABLET BY MOUTH EVERY NIGHT AT BEDTIME 90 tablet 3     potassium chloride (KLOR-CON) 20 MEQ packet Take 20 mEq by mouth 2 times daily 180 packet 3     pravastatin (PRAVACHOL) 80 MG tablet Take one by mouth daily. (Patient taking differently: Take 80 mg by mouth every evening Take one by mouth daily.) 90 tablet 3     hydrochlorothiazide (HYDRODIURIL) 25 MG tablet Take one by mouth daily (Patient not taking: Reported on 1/23/2019) 90 tablet 3     No Known Allergies  Recent Labs   Lab Test 01/23/19  1044 01/15/19  1741 01/14/19  1849  10/23/17  1037 12/31/14  0955  06/26/13 05/21/12   LDL  --   --   --   --  107* 139*  --  142  --  112   HDL  --   --   --   --  48* 49*  --  47  --  47   TRIG  --   --   --   --  198* 196*  --  154  --  187   ALT  --   --  30  --   --  34  --  40  --  33   CR 0.50* 0.65 0.64   < > 0.62 0.66  --  0.8  --  0.7   GFRESTIMATED >90 >90 >90   < > >90 >90  Non African American GFR Calc    --  >60   < >  --    GFRESTBLACK >90 >90 >90   < > >90 >90  African American GFR Calc     < >  --   --   --    POTASSIUM 4.1 2.7* 2.6*   < > 2.9* 4.1  --  4.6  --  3.5   TSH  --   --  1.31  --   --   --   --   --   --  3.20    < > = values in this interval not displayed.      BP Readings from Last 3 Encounters:   01/23/19 110/70   01/15/19 111/68   01/14/19 122/80    Wt Readings from Last 3 Encounters:   01/23/19 69.9 kg (154 lb)   01/15/19 68 kg (150 lb)   01/14/19 68 kg (150 lb)                    Reviewed and updated as needed this visit by clinical staff  Tobacco  Allergies  Meds  Med Hx  Surg Hx  Fam Hx  Soc Hx      Reviewed and updated as needed this visit by Provider          ROS: 10 point ROS neg other than the symptoms noted above in the HPI.    OBJECTIVE:     /70 (BP Location: Right arm, Patient Position: Chair, Cuff Size: Adult Regular)   Pulse 59   " Temp 98.2  F (36.8  C) (Oral)   Resp 16   Ht 1.6 m (5' 3\")   Wt 69.9 kg (154 lb)   LMP 05/08/2005   SpO2 94%   BMI 27.28 kg/m    Body mass index is 27.28 kg/m .  GENERAL: healthy, alert and no distress  HENT: ear canals and TM's normal, pharynx without erythema  NECK: no adenopathy, no asymmetry  RESP: lungs clear to auscultation - no rales, rhonchi or wheezes  CV: regular rate and rhythm, normal S1 S2, no S3 or S4, no murmur  ABDOMEN: soft, nontender, no hepatosplenomegaly, no masses and bowel sounds normal  MS: no gross musculoskeletal defects noted      Diagnostic Test Results:  No results found for this or any previous visit (from the past 24 hour(s)).    ASSESSMENT/PLAN:             1. Essential hypertension, benign    - Basic metabolic panel  - **Basic metabolic panel FUTURE anytime; Future  - metoprolol tartrate (LOPRESSOR) 50 MG tablet; TAKE ONE TABLET BY MOUTH EVERY NIGHT AT BEDTIME  Dispense: 90 tablet; Refill: 3    2. Hypokalemia    - Basic metabolic panel  - **Basic metabolic panel FUTURE anytime; Future    3. BENIGN HYPERTENSION    - Basic metabolic panel  - **Basic metabolic panel FUTURE anytime; Future  - metoprolol tartrate (LOPRESSOR) 50 MG tablet; TAKE ONE TABLET BY MOUTH EVERY NIGHT AT BEDTIME  Dispense: 90 tablet; Refill: 3    4. Need for vaccination    - TDAP VACCINE (ADACEL) [45565.002]  - 1st  Administration  [[id number]]    See Patient Instructions  Patient Instructions   Tetanus booster given.  Do FIT kit and mammogram scheduled for today.  Continue with current medications and no further hydrochlorothiazide.  Will let you know results of lab work done today.  Finish potassium supplement once daily only and will recheck labs in 2 weeks off of all potassium to make sure level remains good.      Our Clinic hours are:  Mondays    7:20 am - 7 pm  Tues -  Fri  7:20 am - 5 pm    Clinic Phone: [phone]    The clinic lab opens at 7:30 am Mon - Fri and appointments are required.    [facility] [place]  Ph. [phone]  Monday  8 am - 7pm  Tues - Fri 8 am - 5:30 pm             HARRIET Montez [facility]

## 2019-01-23 NOTE — NURSING NOTE
Prior to injection, verified patient identity using patient's name and date of birth.  Due to injection administration, patient instructed to remain in clinic for 15 minutes  afterwards, and to report any adverse reaction to me immediately.    Screening Questionnaire for Adult Immunization    Are you sick today?   No   Do you have allergies to medications, food, a vaccine component or latex?   No   Have you ever had a serious reaction after receiving a vaccination?   No   Do you have a long-term health problem with heart disease, lung disease, asthma, kidney disease, metabolic disease (e.g. diabetes), anemia, or other blood disorder?   No   Do you have cancer, leukemia, HIV/AIDS, or any other immune system problem?   No   In the past 3 months, have you taken medications that affect  your immune system, such as prednisone, other steroids, or anticancer drugs; drugs for the treatment of rheumatoid arthritis, Crohn s disease, or psoriasis; or have you had radiation treatments?   No   Have you had a seizure, or a brain or other nervous system problem?   No   During the past year, have you received a transfusion of blood or blood     products, or been given immune (gamma) globulin or antiviral drug?   No   For women: Are you pregnant or is there a chance you could become        pregnant during the next month?   No   Have you received any vaccinations in the past 4 weeks?   No     Immunization questionnaire answers were all negative.        Per orders of Luci Silver, injection of Adacel given by Purvi Simental. Patient instructed to remain in clinic for 15 minutes afterwards, and to report any adverse reaction to me immediately.       Screening performed by Purvi Simental on 1/23/2019 at 10:45 AM.

## 2019-01-23 NOTE — PATIENT INSTRUCTIONS
Tetanus booster given.  Do FIT kit and mammogram scheduled for today.  Continue with current medications and no further hydrochlorothiazide.  Will let you know results of lab work done today.  Finish potassium supplement once daily only and will recheck labs in 2 weeks off of all potassium to make sure level remains good.      Our Clinic hours are:  Mondays    7:20 am - 7 pm  Tues -  Fri  7:20 am - 5 pm    Clinic Phone: [phone]    The clinic lab opens at 7:30 am Mon - Fri and appointments are required.    [facility] [place]  Ph. [phone]  Monday  8 am - 7pm  Tues - Fri 8 am - 5:30 pm

## 2019-02-13 DIAGNOSIS — E87.6 HYPOKALEMIA: ICD-10-CM

## 2019-02-13 DIAGNOSIS — I10 ESSENTIAL HYPERTENSION, BENIGN: ICD-10-CM

## 2019-02-13 LAB
ANION GAP SERPL CALCULATED.3IONS-SCNC: 6 MMOL/L (ref 3–14)
BUN SERPL-MCNC: 8 MG/DL (ref 7–30)
CALCIUM SERPL-MCNC: 8.9 MG/DL (ref 8.5–10.1)
CHLORIDE SERPL-SCNC: 105 MMOL/L (ref 94–109)
CO2 SERPL-SCNC: 27 MMOL/L (ref 20–32)
CREAT SERPL-MCNC: 0.65 MG/DL (ref 0.52–1.04)
GFR SERPL CREATININE-BSD FRML MDRD: >90 ML/MIN/{1.73_M2}
GLUCOSE SERPL-MCNC: 83 MG/DL (ref 70–99)
POTASSIUM SERPL-SCNC: 4 MMOL/L (ref 3.4–5.3)
SODIUM SERPL-SCNC: 138 MMOL/L (ref 133–144)

## 2019-02-13 PROCEDURE — 36415 COLL VENOUS BLD VENIPUNCTURE: CPT | Performed by: NURSE PRACTITIONER

## 2019-02-13 PROCEDURE — 80048 BASIC METABOLIC PNL TOTAL CA: CPT | Performed by: NURSE PRACTITIONER

## 2019-03-07 PROCEDURE — 82274 ASSAY TEST FOR BLOOD FECAL: CPT | Performed by: NURSE PRACTITIONER

## 2019-03-09 LAB — HEMOCCULT STL QL IA: NEGATIVE

## 2019-03-11 DIAGNOSIS — Z12.11 COLON CANCER SCREENING: ICD-10-CM

## 2020-06-23 NOTE — TELEPHONE ENCOUNTER
"Requested Prescriptions   Pending Prescriptions Disp Refills     hydrochlorothiazide (HYDRODIURIL) 25 MG tablet 90 tablet 3     Sig: Take one by mouth daily    Diuretics (Including Combos) Protocol Failed - 1/7/2019  4:42 PM       Failed - Blood pressure under 140/90 in past 12 months    BP Readings from Last 3 Encounters:   10/24/17 131/77   08/14/17 114/68 06/14/16 123/82                Failed - Recent (12 mo) or future (30 days) visit within the authorizing provider's specialty    Patient had office visit in the last 12 months or has a visit in the next 30 days with authorizing provider or within the authorizing provider's specialty.  See \"Patient Info\" tab in inbasket, or \"Choose Columns\" in Meds & Orders section of the refill encounter.             Failed - Normal serum creatinine on file in past 12 months    Recent Labs   Lab Test 05/22/18  1050   CR 0.50*             Failed - Normal serum potassium on file in past 12 months    Recent Labs   Lab Test 05/22/18  1050   POTASSIUM 3.1*                   Passed - Medication is active on med list       Passed - Patient is age 18 or older       Passed - No active pregancy on record       Passed - Normal serum sodium on file in past 12 months    Recent Labs   Lab Test 05/22/18  1050                Passed - No positive pregnancy test in past 12 months        metoprolol tartrate (LOPRESSOR) 50 MG tablet 90 tablet 3     Sig: TAKE ONE TABLET BY MOUTH EVERY NIGHT AT BEDTIME    Beta-Blockers Protocol Failed - 1/7/2019  4:42 PM       Failed - Blood pressure under 140/90 in past 12 months    BP Readings from Last 3 Encounters:   10/24/17 131/77   08/14/17 114/68 06/14/16 123/82                Failed - Recent (12 mo) or future (30 days) visit within the authorizing provider's specialty    Patient had office visit in the last 12 months or has a visit in the next 30 days with authorizing provider or within the authorizing provider's specialty.  See \"Patient Info\" tab " [FreeTextEntry1] : \par Remains seizure free on Oxcarbazepine 600mg BID. Normal limited exam \par \par  "in inbasket, or \"Choose Columns\" in Meds & Orders section of the refill encounter.             Passed - Patient is age 6 or older       Passed - Medication is active on med list        amLODIPine (NORVASC) 5 MG tablet 90 tablet 3     Sig: Take 1 tablet (5 mg) by mouth daily    Calcium Channel Blockers Protocol  Failed - 1/7/2019  4:42 PM       Failed - Blood pressure under 140/90 in past 12 months    BP Readings from Last 3 Encounters:   10/24/17 131/77   08/14/17 114/68   06/14/16 123/82                Failed - Recent (12 mo) or future (30 days) visit within the authorizing provider's specialty    Patient had office visit in the last 12 months or has a visit in the next 30 days with authorizing provider or within the authorizing provider's specialty.  See \"Patient Info\" tab in inbasket, or \"Choose Columns\" in Meds & Orders section of the refill encounter.             Failed - Normal serum creatinine on file in past 12 months    Recent Labs   Lab Test 05/22/18  1050   CR 0.50*            Passed - Medication is active on med list       Passed - Patient is age 18 or older       Passed - No active pregnancy on record       Passed - No positive pregnancy test in past 12 months          "

## 2021-08-05 NOTE — TELEPHONE ENCOUNTER
Completed form mailed to pt's home - Copy to scanning.     Detail Level: Detailed Add 70871 Cpt? (Important Note: In 2017 The Use Of 91562 Is Being Tracked By Cms To Determine Future Global Period Reimbursement For Global Periods): yes

## 2022-04-06 ENCOUNTER — TRANSFERRED RECORDS (OUTPATIENT)
Dept: HEALTH INFORMATION MANAGEMENT | Facility: CLINIC | Age: 67
End: 2022-04-06
Payer: COMMERCIAL

## 2022-06-30 RX ORDER — PRAVASTATIN SODIUM 80 MG/1
80 TABLET ORAL
Status: ON HOLD | COMMUNITY
Start: 2021-10-18 | End: 2022-07-08

## 2022-06-30 RX ORDER — CYCLOBENZAPRINE HCL 10 MG
10 TABLET ORAL PRN
Status: ON HOLD | COMMUNITY
Start: 2021-10-18 | End: 2022-07-08

## 2022-06-30 RX ORDER — METOPROLOL TARTRATE 50 MG
50 TABLET ORAL
Status: ON HOLD | COMMUNITY
Start: 2021-10-18 | End: 2022-07-08

## 2022-07-03 ENCOUNTER — LAB (OUTPATIENT)
Dept: FAMILY MEDICINE | Facility: CLINIC | Age: 67
End: 2022-07-03
Payer: COMMERCIAL

## 2022-07-03 DIAGNOSIS — Z20.822 ENCOUNTER FOR LABORATORY TESTING FOR COVID-19 VIRUS: ICD-10-CM

## 2022-07-03 PROCEDURE — U0003 INFECTIOUS AGENT DETECTION BY NUCLEIC ACID (DNA OR RNA); SEVERE ACUTE RESPIRATORY SYNDROME CORONAVIRUS 2 (SARS-COV-2) (CORONAVIRUS DISEASE [COVID-19]), AMPLIFIED PROBE TECHNIQUE, MAKING USE OF HIGH THROUGHPUT TECHNOLOGIES AS DESCRIBED BY CMS-2020-01-R: HCPCS

## 2022-07-03 PROCEDURE — U0005 INFEC AGEN DETEC AMPLI PROBE: HCPCS

## 2022-07-03 PROCEDURE — 99207 PR NO CHARGE LOS: CPT

## 2022-07-04 LAB — SARS-COV-2 RNA RESP QL NAA+PROBE: NEGATIVE

## 2022-07-05 ENCOUNTER — ANESTHESIA EVENT (OUTPATIENT)
Dept: SURGERY | Facility: CLINIC | Age: 67
End: 2022-07-05
Payer: COMMERCIAL

## 2022-07-05 NOTE — PROVIDER NOTIFICATION
07/05/22 1342   Discharge Planning   Patient/Family Anticipates Transition to home   Living Arrangements   People in Home spouse   Type of Residence Private Residence   Equipment Currently Used at Home none   Support System   Support Systems Spouse/Significant Other   Medical Clearance   Date of Physical 06/29/22

## 2022-07-07 ENCOUNTER — ANESTHESIA (OUTPATIENT)
Dept: SURGERY | Facility: CLINIC | Age: 67
End: 2022-07-07
Payer: COMMERCIAL

## 2022-07-07 ENCOUNTER — HOSPITAL ENCOUNTER (OUTPATIENT)
Facility: CLINIC | Age: 67
Discharge: HOME HEALTH | End: 2022-07-08
Attending: ORTHOPAEDIC SURGERY | Admitting: ORTHOPAEDIC SURGERY
Payer: COMMERCIAL

## 2022-07-07 ENCOUNTER — APPOINTMENT (OUTPATIENT)
Dept: GENERAL RADIOLOGY | Facility: CLINIC | Age: 67
End: 2022-07-07
Attending: ORTHOPAEDIC SURGERY
Payer: COMMERCIAL

## 2022-07-07 DIAGNOSIS — Z96.651 STATUS POST TOTAL RIGHT KNEE REPLACEMENT: Primary | ICD-10-CM

## 2022-07-07 PROBLEM — Z96.659 STATUS POST TOTAL KNEE REPLACEMENT: Status: ACTIVE | Noted: 2022-07-07

## 2022-07-07 PROCEDURE — 250N000013 HC RX MED GY IP 250 OP 250 PS 637: Performed by: PHYSICIAN ASSISTANT

## 2022-07-07 PROCEDURE — 250N000009 HC RX 250: Performed by: ORTHOPAEDIC SURGERY

## 2022-07-07 PROCEDURE — 999N000065 XR KNEE PORT RIGHT 1/2 VIEWS

## 2022-07-07 PROCEDURE — 258N000003 HC RX IP 258 OP 636: Performed by: NURSE ANESTHETIST, CERTIFIED REGISTERED

## 2022-07-07 PROCEDURE — 250N000013 HC RX MED GY IP 250 OP 250 PS 637: Performed by: NURSE ANESTHETIST, CERTIFIED REGISTERED

## 2022-07-07 PROCEDURE — 999N000141 HC STATISTIC PRE-PROCEDURE NURSING ASSESSMENT: Performed by: ORTHOPAEDIC SURGERY

## 2022-07-07 PROCEDURE — 360N000077 HC SURGERY LEVEL 4, PER MIN: Performed by: ORTHOPAEDIC SURGERY

## 2022-07-07 PROCEDURE — 250N000013 HC RX MED GY IP 250 OP 250 PS 637: Performed by: ORTHOPAEDIC SURGERY

## 2022-07-07 PROCEDURE — 250N000011 HC RX IP 250 OP 636: Performed by: NURSE ANESTHETIST, CERTIFIED REGISTERED

## 2022-07-07 PROCEDURE — C1713 ANCHOR/SCREW BN/BN,TIS/BN: HCPCS | Performed by: ORTHOPAEDIC SURGERY

## 2022-07-07 PROCEDURE — C1776 JOINT DEVICE (IMPLANTABLE): HCPCS | Performed by: ORTHOPAEDIC SURGERY

## 2022-07-07 PROCEDURE — 710N000009 HC RECOVERY PHASE 1, LEVEL 1, PER MIN: Performed by: ORTHOPAEDIC SURGERY

## 2022-07-07 PROCEDURE — 272N000001 HC OR GENERAL SUPPLY STERILE: Performed by: ORTHOPAEDIC SURGERY

## 2022-07-07 PROCEDURE — 271N000001 HC OR GENERAL SUPPLY NON-STERILE: Performed by: ORTHOPAEDIC SURGERY

## 2022-07-07 PROCEDURE — 73560 X-RAY EXAM OF KNEE 1 OR 2: CPT | Mod: RT

## 2022-07-07 PROCEDURE — 250N000011 HC RX IP 250 OP 636: Performed by: ORTHOPAEDIC SURGERY

## 2022-07-07 PROCEDURE — 250N000011 HC RX IP 250 OP 636: Performed by: PHYSICIAN ASSISTANT

## 2022-07-07 PROCEDURE — 250N000009 HC RX 250: Performed by: NURSE ANESTHETIST, CERTIFIED REGISTERED

## 2022-07-07 PROCEDURE — 370N000017 HC ANESTHESIA TECHNICAL FEE, PER MIN: Performed by: ORTHOPAEDIC SURGERY

## 2022-07-07 DEVICE — IMP COMP FEM S&N LEGION OXIN NARROW CR SZ5N RT 71421255: Type: IMPLANTABLE DEVICE | Site: KNEE | Status: FUNCTIONAL

## 2022-07-07 DEVICE — IMPLANTABLE DEVICE: Type: IMPLANTABLE DEVICE | Site: KNEE | Status: FUNCTIONAL

## 2022-07-07 DEVICE — BONE CEMENT RADIOPAQUE SIMPLEX HV FULL DOSE 6194-1-001: Type: IMPLANTABLE DEVICE | Site: KNEE | Status: FUNCTIONAL

## 2022-07-07 DEVICE — IMP COMP PATELLA GENESIS II 13X29MM 71420574: Type: IMPLANTABLE DEVICE | Site: KNEE | Status: FUNCTIONAL

## 2022-07-07 RX ORDER — OXYCODONE HYDROCHLORIDE 5 MG/1
5-10 TABLET ORAL EVERY 4 HOURS PRN
Qty: 33 TABLET | Refills: 0 | Status: ON HOLD | OUTPATIENT
Start: 2022-07-07 | End: 2023-06-28

## 2022-07-07 RX ORDER — MEPERIDINE HYDROCHLORIDE 25 MG/ML
12.5 INJECTION INTRAMUSCULAR; INTRAVENOUS; SUBCUTANEOUS EVERY 5 MIN PRN
Status: DISCONTINUED | OUTPATIENT
Start: 2022-07-07 | End: 2022-07-07 | Stop reason: HOSPADM

## 2022-07-07 RX ORDER — HYDROMORPHONE HCL IN WATER/PF 6 MG/30 ML
0.4 PATIENT CONTROLLED ANALGESIA SYRINGE INTRAVENOUS
Status: DISCONTINUED | OUTPATIENT
Start: 2022-07-07 | End: 2022-07-08 | Stop reason: HOSPADM

## 2022-07-07 RX ORDER — NALOXONE HYDROCHLORIDE 0.4 MG/ML
0.2 INJECTION, SOLUTION INTRAMUSCULAR; INTRAVENOUS; SUBCUTANEOUS
Status: DISCONTINUED | OUTPATIENT
Start: 2022-07-07 | End: 2022-07-08 | Stop reason: HOSPADM

## 2022-07-07 RX ORDER — ONDANSETRON 4 MG/1
4 TABLET, ORALLY DISINTEGRATING ORAL EVERY 30 MIN PRN
Status: DISCONTINUED | OUTPATIENT
Start: 2022-07-07 | End: 2022-07-07 | Stop reason: HOSPADM

## 2022-07-07 RX ORDER — EPINEPHRINE 1 MG/ML
INJECTION, SOLUTION, CONCENTRATE INTRAVENOUS PRN
Status: DISCONTINUED | OUTPATIENT
Start: 2022-07-07 | End: 2022-07-07

## 2022-07-07 RX ORDER — NALOXONE HYDROCHLORIDE 0.4 MG/ML
0.4 INJECTION, SOLUTION INTRAMUSCULAR; INTRAVENOUS; SUBCUTANEOUS
Status: DISCONTINUED | OUTPATIENT
Start: 2022-07-07 | End: 2022-07-07

## 2022-07-07 RX ORDER — NALOXONE HYDROCHLORIDE 0.4 MG/ML
0.2 INJECTION, SOLUTION INTRAMUSCULAR; INTRAVENOUS; SUBCUTANEOUS
Status: DISCONTINUED | OUTPATIENT
Start: 2022-07-07 | End: 2022-07-07

## 2022-07-07 RX ORDER — AMOXICILLIN 250 MG
1 CAPSULE ORAL 2 TIMES DAILY
Status: DISCONTINUED | OUTPATIENT
Start: 2022-07-07 | End: 2022-07-08 | Stop reason: HOSPADM

## 2022-07-07 RX ORDER — CEFAZOLIN SODIUM/WATER 2 G/20 ML
2 SYRINGE (ML) INTRAVENOUS
Status: COMPLETED | OUTPATIENT
Start: 2022-07-07 | End: 2022-07-07

## 2022-07-07 RX ORDER — ACETAMINOPHEN 325 MG/1
975 TABLET ORAL ONCE
Status: COMPLETED | OUTPATIENT
Start: 2022-07-07 | End: 2022-07-07

## 2022-07-07 RX ORDER — GABAPENTIN 300 MG/1
300 CAPSULE ORAL
Status: COMPLETED | OUTPATIENT
Start: 2022-07-07 | End: 2022-07-07

## 2022-07-07 RX ORDER — ONDANSETRON 2 MG/ML
4 INJECTION INTRAMUSCULAR; INTRAVENOUS EVERY 6 HOURS PRN
Status: DISCONTINUED | OUTPATIENT
Start: 2022-07-07 | End: 2022-07-08 | Stop reason: HOSPADM

## 2022-07-07 RX ORDER — PROPOFOL 10 MG/ML
INJECTION, EMULSION INTRAVENOUS CONTINUOUS PRN
Status: DISCONTINUED | OUTPATIENT
Start: 2022-07-07 | End: 2022-07-07

## 2022-07-07 RX ORDER — ONDANSETRON 2 MG/ML
4 INJECTION INTRAMUSCULAR; INTRAVENOUS EVERY 30 MIN PRN
Status: DISCONTINUED | OUTPATIENT
Start: 2022-07-07 | End: 2022-07-07 | Stop reason: HOSPADM

## 2022-07-07 RX ORDER — PRAVASTATIN SODIUM 20 MG
80 TABLET ORAL EVERY EVENING
Status: DISCONTINUED | OUTPATIENT
Start: 2022-07-07 | End: 2022-07-08 | Stop reason: HOSPADM

## 2022-07-07 RX ORDER — ACETAMINOPHEN 325 MG/1
975 TABLET ORAL EVERY 8 HOURS
Status: DISCONTINUED | OUTPATIENT
Start: 2022-07-07 | End: 2022-07-08 | Stop reason: HOSPADM

## 2022-07-07 RX ORDER — KETOROLAC TROMETHAMINE 15 MG/ML
15 INJECTION, SOLUTION INTRAMUSCULAR; INTRAVENOUS EVERY 6 HOURS
Status: DISCONTINUED | OUTPATIENT
Start: 2022-07-08 | End: 2022-07-08 | Stop reason: HOSPADM

## 2022-07-07 RX ORDER — BUPIVACAINE HYDROCHLORIDE 5 MG/ML
INJECTION, SOLUTION PERINEURAL PRN
Status: DISCONTINUED | OUTPATIENT
Start: 2022-07-07 | End: 2022-07-07 | Stop reason: HOSPADM

## 2022-07-07 RX ORDER — OXYCODONE HYDROCHLORIDE 5 MG/1
5 TABLET ORAL EVERY 4 HOURS PRN
Status: DISCONTINUED | OUTPATIENT
Start: 2022-07-07 | End: 2022-07-08 | Stop reason: HOSPADM

## 2022-07-07 RX ORDER — CEFAZOLIN SODIUM 2 G/100ML
2 INJECTION, SOLUTION INTRAVENOUS EVERY 8 HOURS
Status: COMPLETED | OUTPATIENT
Start: 2022-07-07 | End: 2022-07-08

## 2022-07-07 RX ORDER — DIMENHYDRINATE 50 MG/ML
25 INJECTION, SOLUTION INTRAMUSCULAR; INTRAVENOUS
Status: DISCONTINUED | OUTPATIENT
Start: 2022-07-07 | End: 2022-07-07 | Stop reason: HOSPADM

## 2022-07-07 RX ORDER — FENTANYL CITRATE 50 UG/ML
50 INJECTION, SOLUTION INTRAMUSCULAR; INTRAVENOUS EVERY 5 MIN PRN
Status: DISCONTINUED | OUTPATIENT
Start: 2022-07-07 | End: 2022-07-07 | Stop reason: HOSPADM

## 2022-07-07 RX ORDER — AMLODIPINE BESYLATE 5 MG/1
5 TABLET ORAL DAILY
Status: DISCONTINUED | OUTPATIENT
Start: 2022-07-08 | End: 2022-07-08 | Stop reason: HOSPADM

## 2022-07-07 RX ORDER — MAGNESIUM SULFATE HEPTAHYDRATE 40 MG/ML
INJECTION, SOLUTION INTRAVENOUS PRN
Status: DISCONTINUED | OUTPATIENT
Start: 2022-07-07 | End: 2022-07-07

## 2022-07-07 RX ORDER — BISACODYL 10 MG
10 SUPPOSITORY, RECTAL RECTAL DAILY PRN
Status: DISCONTINUED | OUTPATIENT
Start: 2022-07-07 | End: 2022-07-08 | Stop reason: HOSPADM

## 2022-07-07 RX ORDER — OXYCODONE HYDROCHLORIDE 5 MG/1
5 TABLET ORAL EVERY 4 HOURS PRN
Status: DISCONTINUED | OUTPATIENT
Start: 2022-07-07 | End: 2022-07-07 | Stop reason: HOSPADM

## 2022-07-07 RX ORDER — HYDROXYZINE HYDROCHLORIDE 10 MG/1
10 TABLET, FILM COATED ORAL EVERY 6 HOURS PRN
Status: DISCONTINUED | OUTPATIENT
Start: 2022-07-07 | End: 2022-07-07 | Stop reason: HOSPADM

## 2022-07-07 RX ORDER — TRANEXAMIC ACID 650 MG/1
1950 TABLET ORAL ONCE
Status: COMPLETED | OUTPATIENT
Start: 2022-07-07 | End: 2022-07-07

## 2022-07-07 RX ORDER — CEFAZOLIN SODIUM/WATER 2 G/20 ML
2 SYRINGE (ML) INTRAVENOUS SEE ADMIN INSTRUCTIONS
Status: DISCONTINUED | OUTPATIENT
Start: 2022-07-07 | End: 2022-07-07 | Stop reason: HOSPADM

## 2022-07-07 RX ORDER — ACETAMINOPHEN 325 MG/1
650 TABLET ORAL EVERY 4 HOURS PRN
Status: DISCONTINUED | OUTPATIENT
Start: 2022-07-10 | End: 2022-07-08 | Stop reason: HOSPADM

## 2022-07-07 RX ORDER — LIDOCAINE HYDROCHLORIDE 10 MG/ML
INJECTION, SOLUTION INFILTRATION; PERINEURAL PRN
Status: DISCONTINUED | OUTPATIENT
Start: 2022-07-07 | End: 2022-07-07

## 2022-07-07 RX ORDER — HYDROXYZINE PAMOATE 25 MG/1
25 CAPSULE ORAL 3 TIMES DAILY PRN
Qty: 30 CAPSULE | Refills: 0 | Status: SHIPPED | OUTPATIENT
Start: 2022-07-07

## 2022-07-07 RX ORDER — SODIUM CHLORIDE, SODIUM LACTATE, POTASSIUM CHLORIDE, CALCIUM CHLORIDE 600; 310; 30; 20 MG/100ML; MG/100ML; MG/100ML; MG/100ML
INJECTION, SOLUTION INTRAVENOUS CONTINUOUS
Status: DISCONTINUED | OUTPATIENT
Start: 2022-07-07 | End: 2022-07-07 | Stop reason: HOSPADM

## 2022-07-07 RX ORDER — NALOXONE HYDROCHLORIDE 0.4 MG/ML
0.4 INJECTION, SOLUTION INTRAMUSCULAR; INTRAVENOUS; SUBCUTANEOUS
Status: DISCONTINUED | OUTPATIENT
Start: 2022-07-07 | End: 2022-07-08 | Stop reason: HOSPADM

## 2022-07-07 RX ORDER — BUPIVACAINE HYDROCHLORIDE 7.5 MG/ML
INJECTION, SOLUTION INTRASPINAL PRN
Status: DISCONTINUED | OUTPATIENT
Start: 2022-07-07 | End: 2022-07-07

## 2022-07-07 RX ORDER — LIDOCAINE HYDROCHLORIDE AND EPINEPHRINE BITARTRATE 20; .01 MG/ML; MG/ML
INJECTION, SOLUTION SUBCUTANEOUS PRN
Status: DISCONTINUED | OUTPATIENT
Start: 2022-07-07 | End: 2022-07-07

## 2022-07-07 RX ORDER — HYDROMORPHONE HCL IN WATER/PF 6 MG/30 ML
0.2 PATIENT CONTROLLED ANALGESIA SYRINGE INTRAVENOUS
Status: DISCONTINUED | OUTPATIENT
Start: 2022-07-07 | End: 2022-07-08 | Stop reason: HOSPADM

## 2022-07-07 RX ORDER — HYDROXYZINE HYDROCHLORIDE 25 MG/1
25 TABLET, FILM COATED ORAL EVERY 6 HOURS PRN
Status: DISCONTINUED | OUTPATIENT
Start: 2022-07-07 | End: 2022-07-08 | Stop reason: HOSPADM

## 2022-07-07 RX ORDER — SODIUM CHLORIDE, SODIUM LACTATE, POTASSIUM CHLORIDE, CALCIUM CHLORIDE 600; 310; 30; 20 MG/100ML; MG/100ML; MG/100ML; MG/100ML
INJECTION, SOLUTION INTRAVENOUS CONTINUOUS
Status: DISCONTINUED | OUTPATIENT
Start: 2022-07-07 | End: 2022-07-08 | Stop reason: HOSPADM

## 2022-07-07 RX ORDER — GLYCOPYRROLATE 0.2 MG/ML
INJECTION, SOLUTION INTRAMUSCULAR; INTRAVENOUS PRN
Status: DISCONTINUED | OUTPATIENT
Start: 2022-07-07 | End: 2022-07-07

## 2022-07-07 RX ORDER — ONDANSETRON 2 MG/ML
INJECTION INTRAMUSCULAR; INTRAVENOUS PRN
Status: DISCONTINUED | OUTPATIENT
Start: 2022-07-07 | End: 2022-07-07

## 2022-07-07 RX ORDER — FENTANYL CITRATE 50 UG/ML
INJECTION, SOLUTION INTRAMUSCULAR; INTRAVENOUS PRN
Status: DISCONTINUED | OUTPATIENT
Start: 2022-07-07 | End: 2022-07-07

## 2022-07-07 RX ORDER — ROPIVACAINE HYDROCHLORIDE 5 MG/ML
INJECTION, SOLUTION EPIDURAL; INFILTRATION; PERINEURAL PRN
Status: DISCONTINUED | OUTPATIENT
Start: 2022-07-07 | End: 2022-07-07

## 2022-07-07 RX ORDER — LIDOCAINE 40 MG/G
CREAM TOPICAL
Status: DISCONTINUED | OUTPATIENT
Start: 2022-07-07 | End: 2022-07-08 | Stop reason: HOSPADM

## 2022-07-07 RX ORDER — ONDANSETRON 4 MG/1
4 TABLET, ORALLY DISINTEGRATING ORAL EVERY 6 HOURS PRN
Status: DISCONTINUED | OUTPATIENT
Start: 2022-07-07 | End: 2022-07-08 | Stop reason: HOSPADM

## 2022-07-07 RX ORDER — LIDOCAINE 40 MG/G
CREAM TOPICAL
Status: DISCONTINUED | OUTPATIENT
Start: 2022-07-07 | End: 2022-07-07 | Stop reason: HOSPADM

## 2022-07-07 RX ORDER — HYDROMORPHONE HCL IN WATER/PF 6 MG/30 ML
0.4 PATIENT CONTROLLED ANALGESIA SYRINGE INTRAVENOUS EVERY 5 MIN PRN
Status: DISCONTINUED | OUTPATIENT
Start: 2022-07-07 | End: 2022-07-07 | Stop reason: HOSPADM

## 2022-07-07 RX ORDER — CELECOXIB 200 MG/1
200 CAPSULE ORAL ONCE
Status: COMPLETED | OUTPATIENT
Start: 2022-07-07 | End: 2022-07-07

## 2022-07-07 RX ORDER — METOPROLOL TARTRATE 25 MG/1
50 TABLET, FILM COATED ORAL AT BEDTIME
Status: DISCONTINUED | OUTPATIENT
Start: 2022-07-07 | End: 2022-07-08 | Stop reason: HOSPADM

## 2022-07-07 RX ORDER — OXYCODONE HYDROCHLORIDE 5 MG/1
10 TABLET ORAL EVERY 4 HOURS PRN
Status: DISCONTINUED | OUTPATIENT
Start: 2022-07-07 | End: 2022-07-08 | Stop reason: HOSPADM

## 2022-07-07 RX ORDER — POLYETHYLENE GLYCOL 3350 17 G/17G
17 POWDER, FOR SOLUTION ORAL DAILY
Status: DISCONTINUED | OUTPATIENT
Start: 2022-07-08 | End: 2022-07-08 | Stop reason: HOSPADM

## 2022-07-07 RX ORDER — DEXAMETHASONE SODIUM PHOSPHATE 4 MG/ML
INJECTION, SOLUTION INTRA-ARTICULAR; INTRALESIONAL; INTRAMUSCULAR; INTRAVENOUS; SOFT TISSUE PRN
Status: DISCONTINUED | OUTPATIENT
Start: 2022-07-07 | End: 2022-07-07

## 2022-07-07 RX ORDER — AMOXICILLIN 250 MG
1-2 CAPSULE ORAL DAILY PRN
Qty: 15 TABLET | Refills: 0 | Status: ON HOLD | OUTPATIENT
Start: 2022-07-07 | End: 2023-06-28

## 2022-07-07 RX ORDER — PROCHLORPERAZINE MALEATE 5 MG
5 TABLET ORAL EVERY 6 HOURS PRN
Status: DISCONTINUED | OUTPATIENT
Start: 2022-07-07 | End: 2022-07-08 | Stop reason: HOSPADM

## 2022-07-07 RX ORDER — PROPOFOL 10 MG/ML
INJECTION, EMULSION INTRAVENOUS PRN
Status: DISCONTINUED | OUTPATIENT
Start: 2022-07-07 | End: 2022-07-07

## 2022-07-07 RX ORDER — ACETAMINOPHEN 325 MG/1
650 TABLET ORAL EVERY 4 HOURS PRN
Qty: 100 TABLET | Refills: 0 | Status: ON HOLD
Start: 2022-07-07 | End: 2023-06-28

## 2022-07-07 RX ADMIN — PROPOFOL 20 MG: 10 INJECTION, EMULSION INTRAVENOUS at 15:36

## 2022-07-07 RX ADMIN — OXYCODONE HYDROCHLORIDE 5 MG: 5 TABLET ORAL at 19:37

## 2022-07-07 RX ADMIN — EPINEPHRINE 0.2 MG: 1 INJECTION, SOLUTION INTRAMUSCULAR; SUBCUTANEOUS at 14:17

## 2022-07-07 RX ADMIN — ACETAMINOPHEN 325MG 975 MG: 325 TABLET ORAL at 13:11

## 2022-07-07 RX ADMIN — FENTANYL CITRATE 100 MCG: 50 INJECTION, SOLUTION INTRAMUSCULAR; INTRAVENOUS at 14:13

## 2022-07-07 RX ADMIN — ROPIVACAINE HYDROCHLORIDE 20 ML: 5 INJECTION, SOLUTION EPIDURAL; INFILTRATION; PERINEURAL at 16:00

## 2022-07-07 RX ADMIN — SODIUM CHLORIDE, POTASSIUM CHLORIDE, SODIUM LACTATE AND CALCIUM CHLORIDE 10 ML: 600; 310; 30; 20 INJECTION, SOLUTION INTRAVENOUS at 13:24

## 2022-07-07 RX ADMIN — PROPOFOL 20 MG: 10 INJECTION, EMULSION INTRAVENOUS at 15:33

## 2022-07-07 RX ADMIN — GLYCOPYRROLATE 0.1 MG: 0.2 INJECTION, SOLUTION INTRAMUSCULAR; INTRAVENOUS at 14:12

## 2022-07-07 RX ADMIN — HYDROXYZINE HYDROCHLORIDE 25 MG: 25 TABLET, FILM COATED ORAL at 19:37

## 2022-07-07 RX ADMIN — Medication 5 ML: at 15:58

## 2022-07-07 RX ADMIN — GLYCOPYRROLATE 0.1 MG: 0.2 INJECTION, SOLUTION INTRAMUSCULAR; INTRAVENOUS at 14:10

## 2022-07-07 RX ADMIN — TRANEXAMIC ACID 1950 MG: 650 TABLET ORAL at 13:10

## 2022-07-07 RX ADMIN — SENNOSIDES AND DOCUSATE SODIUM 1 TABLET: 50; 8.6 TABLET ORAL at 19:37

## 2022-07-07 RX ADMIN — ONDANSETRON 4 MG: 2 INJECTION INTRAMUSCULAR; INTRAVENOUS at 15:40

## 2022-07-07 RX ADMIN — OXYCODONE HYDROCHLORIDE 5 MG: 5 TABLET ORAL at 18:30

## 2022-07-07 RX ADMIN — MIDAZOLAM 1 MG: 1 INJECTION INTRAMUSCULAR; INTRAVENOUS at 14:18

## 2022-07-07 RX ADMIN — DEXAMETHASONE SODIUM PHOSPHATE 4 MG: 4 INJECTION, SOLUTION INTRA-ARTICULAR; INTRALESIONAL; INTRAMUSCULAR; INTRAVENOUS; SOFT TISSUE at 16:00

## 2022-07-07 RX ADMIN — MIDAZOLAM 1 MG: 1 INJECTION INTRAMUSCULAR; INTRAVENOUS at 14:43

## 2022-07-07 RX ADMIN — DEXAMETHASONE SODIUM PHOSPHATE 4 MG: 4 INJECTION, SOLUTION INTRA-ARTICULAR; INTRALESIONAL; INTRAMUSCULAR; INTRAVENOUS; SOFT TISSUE at 14:27

## 2022-07-07 RX ADMIN — BUPIVACAINE HYDROCHLORIDE IN DEXTROSE 1.6 ML: 7.5 INJECTION, SOLUTION SUBARACHNOID at 14:17

## 2022-07-07 RX ADMIN — CELECOXIB 200 MG: 200 CAPSULE ORAL at 13:11

## 2022-07-07 RX ADMIN — CEFAZOLIN SODIUM 2 G: 2 INJECTION, SOLUTION INTRAVENOUS at 21:53

## 2022-07-07 RX ADMIN — OXYCODONE HYDROCHLORIDE 10 MG: 5 TABLET ORAL at 23:46

## 2022-07-07 RX ADMIN — MAGNESIUM SULFATE HEPTAHYDRATE 2 G: 40 INJECTION, SOLUTION INTRAVENOUS at 14:10

## 2022-07-07 RX ADMIN — PRAVASTATIN SODIUM 80 MG: 20 TABLET ORAL at 18:28

## 2022-07-07 RX ADMIN — LIDOCAINE HYDROCHLORIDE 20 MG: 10 INJECTION, SOLUTION INFILTRATION; PERINEURAL at 14:16

## 2022-07-07 RX ADMIN — GABAPENTIN 300 MG: 300 CAPSULE ORAL at 13:11

## 2022-07-07 RX ADMIN — LIDOCAINE HYDROCHLORIDE 100 MG: 10 INJECTION, SOLUTION INFILTRATION; PERINEURAL at 14:22

## 2022-07-07 RX ADMIN — Medication 2 G: at 14:04

## 2022-07-07 RX ADMIN — PHENYLEPHRINE HYDROCHLORIDE 100 MCG: 10 INJECTION INTRAVENOUS at 14:47

## 2022-07-07 RX ADMIN — ACETAMINOPHEN 325MG 975 MG: 325 TABLET ORAL at 21:53

## 2022-07-07 RX ADMIN — PROPOFOL 50 MCG/KG/MIN: 10 INJECTION, EMULSION INTRAVENOUS at 14:22

## 2022-07-07 RX ADMIN — METOPROLOL TARTRATE 50 MG: 25 TABLET, FILM COATED ORAL at 21:53

## 2022-07-07 RX ADMIN — MIDAZOLAM 2 MG: 1 INJECTION INTRAMUSCULAR; INTRAVENOUS at 14:10

## 2022-07-07 RX ADMIN — ASPIRIN 325 MG: 325 TABLET, COATED ORAL at 18:28

## 2022-07-07 RX ADMIN — Medication 25 MCG: at 16:00

## 2022-07-07 ASSESSMENT — LIFESTYLE VARIABLES: TOBACCO_USE: 1

## 2022-07-07 ASSESSMENT — ENCOUNTER SYMPTOMS: DYSRHYTHMIAS: 1

## 2022-07-07 NOTE — ANESTHESIA PROCEDURE NOTES
Femoral (distal femoral-adductor canal) Procedure Note    Pre-Procedure   Staff -        CRNA: Marla Null APRN CRNA       Performed By: CRNA       Location: post-op       Procedure Start/Stop Times: 7/7/2022 3:52 PM and 7/7/2022 4:03 PM       Pre-Anesthestic Checklist: patient identified, IV checked, site marked, risks and benefits discussed, informed consent, monitors and equipment checked, pre-op evaluation, at physician/surgeon's request and post-op pain management  Timeout:       Correct Patient: Yes        Correct Procedure: Yes        Correct Site: Yes        Correct Position: Yes        Correct Laterality: Yes        Site Marked: Yes  Procedure Documentation  Procedure: Femoral (distal femoral-adductor canal)       Diagnosis: PAIN       Laterality: right       Patient Position: supine       Patient Prep/Sterile Barriers: sterile gloves, mask, patient draped       Skin prep: Chloraprep       Needle Type: insulated and short bevel       Needle Gauge: 20.        Needle Length (Inches): 4        Ultrasound guided       1. Ultrasound was used to identify targeted nerve, plexus, vascular marker, or fascial plane and place a needle adjacent to it in real-time.       2. Ultrasound was used to visualize the spread of anesthetic in close proximity to the above referenced structure.       3. A permanent image is entered into the patient's record.       4. The visualized anatomic structures appeared normal.       5. There were no apparent abnormal pathologic findings.    Assessment/Narrative         The placement was negative for: blood aspirated, painful injection and site bleeding       Paresthesias: No.       Test dose of 5 mL lidocaine 2% w/ 1:200,000 epinephrine at 03:58 CDT.         Test dose negative, 3 minutes after injection, for signs of intravascular, subdural, or intrathecal injection.       Bolus given via needle. no blood aspirated via catheter.        Secured via.        Insertion/Infusion Method:  Single Shot       Complications: none       Injection made incrementally with aspirations every 5 mL.    Medication(s) Administered   Medication Administration Time: 7/7/2022 3:52 PM

## 2022-07-07 NOTE — ANESTHESIA PROCEDURE NOTES
Intrathecal injection Procedure Note    Pre-Procedure   Staff -        CRNA: Marla Null APRN CRNA       Performed By: CRNA       Location: OR       Procedure Start/Stop Times: 7/7/2022 2:14 PM and 7/7/2022 2:17 PM       Pre-Anesthestic Checklist: patient identified, IV checked, risks and benefits discussed, informed consent, monitors and equipment checked, pre-op evaluation and at physician/surgeon's request  Timeout:       Correct Patient: Yes        Correct Procedure: Yes        Correct Site: Yes        Correct Position: Yes   Procedure Documentation  Procedure: intrathecal injection       Diagnosis: R knee DJD       Patient Position: sitting       Patient Prep/Sterile Barriers: sterile gloves, mask, patient draped       Skin prep: Betadine       Insertion Site: L3-4. (midline approach).       Needle Gauge: 25.        Needle Length (Inches): 3.5        Spinal Needle Type: Pencan       Introducer used       Introducer: 20 G       # of attempts: 1 and  # of redirects:  0    Assessment/Narrative         Paresthesias: No.       CSF fluid: clear.       Opening pressure was cmH2O while  Sitting.      Medication(s) Administered   Medication Administration Time: 7/7/2022 2:14 PM

## 2022-07-07 NOTE — ANESTHESIA PREPROCEDURE EVALUATION
Anesthesia Pre-Procedure Evaluation    Patient: Katherine Love   MRN: [id number] : 1955        Procedure : Procedure(s):  Total knee arthroplasty          Past Medical History:   Diagnosis Date     Acute sinusitis, unspecified      Anemia, unspecified      Bronchitis, not specified as acute or chronic      Excessive or frequent menstruation      Intramural leiomyoma of uterus      Other disorder of menstruation and other abnormal bleeding from female genital tract     DUB     TOBACCO ABUSE-CONTINUOUS      Unspecified otitis media       Past Surgical History:   Procedure Laterality Date     HC REPAIR UMBILICAL RAMA,5+Y/O,REDUC       HC TREATMENT MISSED  SURG, 1ST TRIMESTER       HYSTERECTOMY, PAP NO LONGER INDICATED  05     ZZC LIGATE FALLOPIAN TUBE,POSTPARTUM       ZZC PART REMOVAL COLON W ANASTOMOSIS      meckels diverticulum      No Known Allergies   Social History     Tobacco Use     Smoking status: Current Some Day Smoker     Packs/day: 0.20     Years: 41.00     Pack years: 8.20     Smokeless tobacco: Never Used     Tobacco comment: 1 pack per week   Substance Use Topics     Alcohol use: Yes     Alcohol/week: 12.0 standard drinks     Types: 12 Cans of beer per week     Comment: occasional      Wt Readings from Last 1 Encounters:   22 69.9 kg (154 lb)        Anesthesia Evaluation   Pt has had prior anesthetic. Type: General and MAC.    No history of anesthetic complications       ROS/MED HX  ENT/Pulmonary:     (+) NUZHAT risk factors, snores loudly, hypertension, tobacco use, Current use,     Neurologic:  - neg neurologic ROS     Cardiovascular:     (+) Dyslipidemia hypertension-----dysrhythmias, Other, Irregular Heartbeat/Palpitations, valvular problems/murmurs (last ekg unchanged, has not had recent echo) mitral valve disorder. Previous cardiac testing   Echo: Date: Results:    Stress Test: Date: Results:    ECG Reviewed: Date:  Results:  Reviewed  Cath: Date: Results:       METS/Exercise Tolerance:     Hematologic:  - neg hematologic  ROS     Musculoskeletal: Comment: lumbosacral DDD      GI/Hepatic:  - neg GI/hepatic ROS     Renal/Genitourinary:  - neg Renal ROS     Endo: Comment: overweight      Psychiatric/Substance Use:  - neg psychiatric ROS     Infectious Disease:  - neg infectious disease ROS     Malignancy:  - neg malignancy ROS     Other:  - neg other ROS          Physical Exam    Airway        Mallampati: III   TM distance: > 3 FB   Neck ROM: full   Mouth opening: > 3 cm    Respiratory Devices and Support         Dental  no notable dental history         Cardiovascular    unable to assess         Pulmonary   pulmonary exam normal                OUTSIDE LABS:  CBC:   Lab Results   Component Value Date    WBC 11.0 01/14/2019    WBC 7.2 06/26/2013    HGB 16.9 (H) 01/14/2019    HGB 16.8 (A) 06/26/2013    HCT 48.5 (H) 01/14/2019    HCT 49.0 06/26/2013     01/14/2019     06/26/2013     BMP:   Lab Results   Component Value Date     02/13/2019     01/23/2019    POTASSIUM 4.0 02/13/2019    POTASSIUM 4.1 01/23/2019    CHLORIDE 105 02/13/2019    CHLORIDE 108 01/23/2019    CO2 27 02/13/2019    CO2 25 01/23/2019    BUN 8 02/13/2019    BUN 9 01/23/2019    CR 0.65 02/13/2019    CR 0.50 (L) 01/23/2019    GLC 83 02/13/2019    GLC 97 01/23/2019     COAGS: No results found for: PTT, INR, FIBR  POC: No results found for: BGM, HCG, HCGS  HEPATIC:   Lab Results   Component Value Date    ALBUMIN 4.1 01/14/2019    PROTTOTAL 7.9 01/14/2019    ALT 30 01/14/2019    AST 16 01/14/2019    ALKPHOS 119 01/14/2019    BILITOTAL 0.3 01/14/2019     OTHER:   Lab Results   Component Value Date    JODY 8.9 02/13/2019    LIPASE 115 05/03/2005    AMYLASE 46 05/03/2005    TSH 1.31 01/14/2019    T4 0.98 02/14/2007    SED 7 05/11/2006       Anesthesia Plan    ASA Status:  3   NPO Status:  NPO Appropriate    Anesthesia Type: Spinal (w adductor canal block post op).      Maintenance: Balanced.         Consents    Anesthesia Plan(s) and associated risks, benefits, and realistic alternatives discussed. Questions answered and patient/representative(s) expressed understanding.     - Discussed: Risks, Benefits and Alternatives for BOTH SEDATION and the PROCEDURE were discussed     - Discussed with:  Patient         Postoperative Care    Pain management: IV analgesics, Oral pain medications, Multi-modal analgesia, Peripheral nerve block (Single Shot).   PONV prophylaxis: Dexamethasone or Solumedrol, Ondansetron (or other 5HT-3)     Comments:                HARRIET Garces CRNA

## 2022-07-07 NOTE — PROCEDURES
07/07/22    PREOPERATIVE DIAGNOSES: Right knee arthritis  POSTOPERATIVE DIAGNOSIS: Right knee arthritis  PROCEDURE: Right total knee arthroplasty.   SURGEON: Severo Edmonds MD   ASSISTANT: Gayathri Simon PA-C The presence of the PA was necessary for safe progression of the case.   ANESTHESIA: Spinal with MAC.   ESTIMATED BLOOD LOSS: 50 mL.   TOURNIQUET TIME: 40 minutes at 250 mmHg.   COMPLICATIONS: None apparent.   DISPOSITION: Stable to PACU.    IMPLANTS USED: [employer] Oxinium Legion size 5N CR femoral component with all polyethylene size 3 tibial component, size 3 by 15mm CR polyethylene and 29 mm patella.     INDICATIONS: Katherine is a 66-year-old female with a long history of progressive right knee pain due to end-stage medial compartment arthritis. Unfortunately, she failed conservative management including therapy and injections. After discussing risks, benefits and surgery, she elected to proceed with a right total knee replacement understanding the risks of infection, damage to vessels and nerves, blood clots, stiffness, ongoing pain, need for revision surgery, need for transfusion.  She has a severe metal allergy and did not want to risk having any nickel in her body.  Therefore, we elected proceed with an Oxinium femoral component all polyethylene tibial component    PROCEDURE: The patient was brought to the preoperative holding area where the right knee was marked. Consent was reviewed. She then was transferred to the operating theater. After induction of successful spinal anesthesia, she was placed supine with a bump under the right hip.  A timeout was performed, verifying correct patient, surgery, location. She received preoperative antibiotics as well as transexemic acid. The right lower extremity was then prepped and draped in standard sterile fashion.     We exsanguinated the leg and inflated the tourniquet. We then made a longitudinal incision over the anterior aspect of the knee. Dissection was  carried down through skin and subcutaneous tissue. A medial parapatellar arthrotomy was made, exposing end stage medial and patellofemoral arthritis.  Synovial tissue from the suprapatellar pouch excised. The anterior horn of the medial meniscus was released and a medial release was performed. Then, the remainder of the fat pad was excised. We turned our attention to the patella. Using a free hand technique, this was resected down to 12 mm and sized to a 29mm, then punched and a metal protector plate was placed. We then turned our attention to the femur. Preoperatively, there was a 8 degree flexion contracture.  Therefore, using an intramedullary alignment guide, 11 mm of distal femur was resected in 5 degrees of valgus.     We then turned our attention to the tibia.  An extramedullary alignment cut was used to resect 2mm off the low medial side, perpendicular to the mechanical axis.  We then turned our attention back to the distal femur and sized it to a size 5.  The epicondylar axis was established and we placed our 4-in-1 cutting block perpendicular to it, in 2 degrees of external rotation. With this in place, our anterior, posterior and chamfer distal femur cuts were made.  We then used a laminar  to open the joint in order to remove medial and lateral meniscus remnants as well as posterior osteophytes.  The PCL was intact.  A variety of polyethylene were trialed, and we felt a 15mm was best, with range of motion from full extension to 135 of flexion, stability to varus and valgus stress, normal POLO test, and the patella tracked well.  After this, sized our tibial component to a 3.  We then drilled and punched our tibial component, lining it with the medial 1/3 of the tibial tubercle. The knee was thoroughly irrigated using pulse lavage. The final components were then cemented in place. All excess cement was removed and the knee was placed into full extension while the cement was curing. Also, the wound  was instilled with dilute Betadine solution.  We then placed the final poly.  The tourniquet was deflated with hemostasis achieved.  The capsular layer was closed with #1 Stratafix, at which point there was 130 degrees of flexion with gravity.  Subcutaneous tissues with 2-0 Vicryl, skin with a 3-0 Monocryl. A sterile dressing was applied and the patient was awoken from anesthesia and transferred to PACU in stable condition.     POSTOPERATIVE PLAN:   1. Weightbearing as tolerated right lower extremity with PT for mobilization.   2. Deep venous thrombosis prophylaxis: Aspirin 325mg daily x 30 days  3. Perioperative antibiotics.   4. Follow up in 2 weeks for wound check.     KEVIN WAGNER MD

## 2022-07-07 NOTE — PROGRESS NOTES
"[facility] ADMISSION NOTE    Patient admitted to room 2205 at approximately 1720 via cart from surgery. Patient was accompanied by surgery RN.     Verbal SBAR report received prior to patient arrival.     Patient trasferred to bed via air cj. Patient alert and oriented X 3. Patient has block and reports that she is starting to have pain  . Admission vital signs: Blood pressure 117/75, pulse 59, temperature 98  F (36.7  C), temperature source Oral, resp. rate 16, height 1.6 m (5' 3\"), weight 69.9 kg (154 lb), last menstrual period 05/08/2005, SpO2 97 %. Patient was oriented to plan of care, call light, bed controls, tv, telephone, bathroom and visiting hours.     Risk Assessment    The following safety risks were identified during admission: fall. Yellow risk band applied: YES.     Skin Initial Assessment    This writer admitted this patient and completed a full skin assessment and Benjamin score in the Adult PCS flowsheet. Appropriate interventions initiated as needed.     Secondary skin check completed by ALEX Solomon.    Benjamin Risk Assessment  Sensory Perception: 4-->no impairment  Moisture: 4-->rarely moist  Activity: 4-->walks frequently  Mobility: 4-->no limitation  Nutrition: 4-->excellent  Friction and Shear: 3-->no apparent problem  Benjamin Score: 23  Mattress: Standard Hospital Mattress (Foam)  Bed Frame: Standard width and length    Education    Patient has a [place] to Observation order: Yes  Observation education completed and documented: Yes      Audrey Aguirre RN    "

## 2022-07-07 NOTE — INTERVAL H&P NOTE
"I have reviewed the surgical (or preoperative) H&P that is linked to this encounter, and examined the patient. There are no significant changes    Clinical Conditions Present on Arrival:  Clinically Significant Risk Factors Present on Admission                   # Overweight: Estimated body mass index is 27.28 kg/m  as calculated from the following:    Height as of this encounter: 1.6 m (5' 3\").    Weight as of this encounter: 69.9 kg (154 lb).       "

## 2022-07-07 NOTE — PROGRESS NOTES
Reviewing chart due to high probability of Admit to Med/Surg unit.  Jose Steel RN on 7/7/2022 at 5:20 PM

## 2022-07-07 NOTE — ANESTHESIA POSTPROCEDURE EVALUATION
Patient: Katherine Love    Procedure: Procedure(s):  Right Total Knee Arthroplasty       Anesthesia Type:  Spinal    Note:  Disposition: Admission   Postop Pain Control: Uneventful            Sign Out: Well controlled pain   PONV: No   Neuro/Psych: Uneventful            Sign Out: Acceptable/Baseline neuro status   Airway/Respiratory: Uneventful            Sign Out: Acceptable/Baseline resp. status   CV/Hemodynamics: Uneventful            Sign Out: Acceptable CV status; No obvious hypovolemia; No obvious fluid overload   Other NRE: NONE   DID A NON-ROUTINE EVENT OCCUR? No           Last vitals:  Vitals Value Taken Time   BP 96/64 07/07/22 1645   Temp 37  C (98.6  F) 07/07/22 1645   Pulse 53 07/07/22 1658   Resp 12 07/07/22 1658   SpO2 98 % 07/07/22 1658   Vitals shown include unvalidated device data.    Electronically Signed By: HARRIET Garcse CRNA  July 7, 2022  5:38 PM

## 2022-07-07 NOTE — ANESTHESIA CARE TRANSFER NOTE
Patient: Katherine Love    Procedure: Procedure(s):  Right Total Knee Arthroplasty       Diagnosis: Arthritis of right knee [M17.11]  Diagnosis Additional Information: No value filed.    Anesthesia Type:   Spinal     Note:    Oropharynx: oropharynx clear of all foreign objects and spontaneously breathing  Level of Consciousness: awake  Oxygen Supplementation: room air    Independent Airway: airway patency satisfactory and stable  Dentition: dentition unchanged  Vital Signs Stable: post-procedure vital signs reviewed and stable  Report to RN Given: handoff report given  Patient transferred to: PACU    Handoff Report: Identifed the Patient, Identified the Reponsible Provider, Reviewed the pertinent medical history, Discussed the surgical course, Reviewed Intra-OP anesthesia mangement and issues during anesthesia, Set expectations for post-procedure period and Allowed opportunity for questions and acknowledgement of understanding      Vitals:  Vitals Value Taken Time   /67 07/07/22 1600   Temp     Pulse 64 07/07/22 1608   Resp 12 07/07/22 1608   SpO2 90 % 07/07/22 1608   Vitals shown include unvalidated device data.    Electronically Signed By: HARRIET Garces CRNA  July 7, 2022  4:09 PM

## 2022-07-08 ENCOUNTER — APPOINTMENT (OUTPATIENT)
Dept: PHYSICAL THERAPY | Facility: CLINIC | Age: 67
End: 2022-07-08
Attending: ORTHOPAEDIC SURGERY
Payer: COMMERCIAL

## 2022-07-08 VITALS
RESPIRATION RATE: 18 BRPM | HEART RATE: 61 BPM | OXYGEN SATURATION: 95 % | HEIGHT: 63 IN | TEMPERATURE: 98.3 F | WEIGHT: 154 LBS | DIASTOLIC BLOOD PRESSURE: 65 MMHG | SYSTOLIC BLOOD PRESSURE: 121 MMHG | BODY MASS INDEX: 27.29 KG/M2

## 2022-07-08 LAB
GLUCOSE BLD-MCNC: 176 MG/DL (ref 70–99)
HGB BLD-MCNC: 12.7 G/DL (ref 11.7–15.7)
HOLD SPECIMEN: NORMAL

## 2022-07-08 PROCEDURE — 36415 COLL VENOUS BLD VENIPUNCTURE: CPT | Performed by: ORTHOPAEDIC SURGERY

## 2022-07-08 PROCEDURE — 250N000013 HC RX MED GY IP 250 OP 250 PS 637: Performed by: ORTHOPAEDIC SURGERY

## 2022-07-08 PROCEDURE — 97110 THERAPEUTIC EXERCISES: CPT | Mod: GP

## 2022-07-08 PROCEDURE — 82947 ASSAY GLUCOSE BLOOD QUANT: CPT | Performed by: ORTHOPAEDIC SURGERY

## 2022-07-08 PROCEDURE — 250N000011 HC RX IP 250 OP 636: Performed by: ORTHOPAEDIC SURGERY

## 2022-07-08 PROCEDURE — 99213 OFFICE O/P EST LOW 20 MIN: CPT | Performed by: PHYSICIAN ASSISTANT

## 2022-07-08 PROCEDURE — 97116 GAIT TRAINING THERAPY: CPT | Mod: GP

## 2022-07-08 PROCEDURE — 250N000013 HC RX MED GY IP 250 OP 250 PS 637: Performed by: PHYSICIAN ASSISTANT

## 2022-07-08 PROCEDURE — 85018 HEMOGLOBIN: CPT | Performed by: ORTHOPAEDIC SURGERY

## 2022-07-08 PROCEDURE — 97161 PT EVAL LOW COMPLEX 20 MIN: CPT | Mod: GP

## 2022-07-08 RX ORDER — CALCIUM CARBONATE 500 MG/1
500 TABLET, CHEWABLE ORAL 3 TIMES DAILY PRN
Status: DISCONTINUED | OUTPATIENT
Start: 2022-07-08 | End: 2022-07-08

## 2022-07-08 RX ADMIN — AMLODIPINE BESYLATE 5 MG: 5 TABLET ORAL at 08:09

## 2022-07-08 RX ADMIN — ACETAMINOPHEN 325MG 975 MG: 325 TABLET ORAL at 06:06

## 2022-07-08 RX ADMIN — OXYCODONE HYDROCHLORIDE 10 MG: 5 TABLET ORAL at 10:33

## 2022-07-08 RX ADMIN — CEFAZOLIN SODIUM 2 G: 2 INJECTION, SOLUTION INTRAVENOUS at 06:06

## 2022-07-08 RX ADMIN — SENNOSIDES AND DOCUSATE SODIUM 1 TABLET: 50; 8.6 TABLET ORAL at 08:09

## 2022-07-08 RX ADMIN — OXYCODONE HYDROCHLORIDE 10 MG: 5 TABLET ORAL at 06:06

## 2022-07-08 RX ADMIN — ASPIRIN 325 MG: 325 TABLET, COATED ORAL at 08:09

## 2022-07-08 RX ADMIN — ACETAMINOPHEN 325MG 975 MG: 325 TABLET ORAL at 12:32

## 2022-07-08 RX ADMIN — KETOROLAC TROMETHAMINE 15 MG: 15 INJECTION, SOLUTION INTRAMUSCULAR; INTRAVENOUS at 00:51

## 2022-07-08 RX ADMIN — KETOROLAC TROMETHAMINE 15 MG: 15 INJECTION, SOLUTION INTRAMUSCULAR; INTRAVENOUS at 08:09

## 2022-07-08 NOTE — PROGRESS NOTES
07/08/22 0856   Quick Adds   Quick Adds Certification   Type of Visit Initial PT Evaluation   Living Environment   People in Home spouse;child(jozef), adult   Current Living Arrangements house   Home Accessibility stairs to enter home   Number of Stairs, Main Entrance 5   Stair Railings, Main Entrance railings safe and in good condition   Number of Stairs, Within Home, Primary none   Stair Railings, Within Home, Primary none   Transportation Anticipated family or friend will provide   Self-Care   Usual Activity Tolerance good   Current Activity Tolerance good   Equipment Currently Used at Home walker, standard;cane, straight;tub bench   Fall history within last six months no   General Information   Onset of Illness/Injury or Date of Surgery 07/08/22   Referring Physician Severo Edmonds MD   Patient/Family Therapy Goals Statement (PT) Return home today.   Pertinent History of Current Problem (include personal factors and/or comorbidities that impact the POC) 66 y.o. female s/p R TKA performed 7/7/22.   Cognition   Affect/Mental Status (Cognition) WFL   Orientation Status (Cognition) oriented x 4   Follows Commands (Cognition) WFL   Pain Assessment   Patient Currently in Pain Yes, see Vital Sign flowsheet   Range of Motion (ROM)   ROM Comment R knee approx 0-5-50 deg   Strength (Manual Muscle Testing)   Strength (Manual Muscle Testing) Able to perform L SLR;Deficits observed during functional mobility   Strength Comments Pain limiting strength performance   Bed Mobility   Comment, (Bed Mobility) supine<>sit with indep, use of hands to lift RLE in/out of bed   Transfers   Comment, (Transfers) sit<>stand from EOB, toilet with Hanna and 2WW, cues for extending RLE in front of patient to reduce pain   Gait/Stairs (Locomotion)   [place] Level (Gait) supervision   Assistive Device (Gait) walker, front-wheeled   Distance in Feet (Required for LE Total Joints) 150   Pattern (Gait) step-to;step-through    Deviations/Abnormal Patterns (Gait) antalgic;base of support, wide;gait speed decreased   Negotiation (Stairs) stairs independence;handrail location;number of steps;ascending technique;descending technique   [place] Level (Stairs) verbal cues;supervision   Handrail Location (Stairs) both sides   Number of Steps (Stairs) 2   Ascending Technique (Stairs) step-to-step   Descending Technique (Stairs) step-to-step   Balance   Balance no deficits were identified   Clinical Impression   Criteria for Skilled Therapeutic Intervention Yes, treatment indicated   PT Diagnosis (PT) impaired mobility s/p R TKA   Influenced by the following impairments pain, reduced R knee ROM, LE weakness   Functional limitations due to impairments impaired transfers, ambulation, and stairs performance   Clinical Presentation (PT Evaluation Complexity) Stable/Uncomplicated   Clinical Presentation Rationale clinical judgment   Clinical Decision Making (Complexity) low complexity   Planned Therapy Interventions (PT) balance training;bed mobility training;cryotherapy;gait training;home exercise program;patient/family education;ROM (range of motion);stair training;strengthening;transfer training   Anticipated Equipment Needs at Discharge (PT)   (has all equipment)   Risk & Benefits of therapy have been explained evaluation/treatment results reviewed;care plan/treatment goals reviewed;risks/benefits reviewed;current/potential barriers reviewed;participants voiced agreement with care plan;participants included;patient   PT Discharge Planning   PT Discharge Recommendation (DC Rec) home with assist;home with home care physical therapy   PT Rationale for DC Rec Has home care PT pre-planned which appears appropriate for patient   PT Brief overview of current status Supervision for mobility with 2WW   Therapy Certification   Start of care date 07/08/22   Certification date from 07/08/22   Certification date to 07/08/22   Medical Diagnosis s/p R TKA    Total Evaluation Time   Total Evaluation Time (Minutes) 10   Physical Therapy Goals   PT Frequency One time eval and treatment only   PT Predicted Duration/Target Date for Goal Attainment 07/08/22   PT Goals Bed Mobility;Transfers;Gait;Stairs   PT: Bed Mobility Supervision/stand-by assist;Supine to/from sit;Goal Met   PT: Transfers Supervision/stand-by assist;Sit to/from stand;Assistive device;Goal Met   PT: Gait Supervision/stand-by assist;Standard walker;150 feet;Goal Met   PT: Stairs Supervision/stand-by assist;2 stairs;Rail on both sides;Goal Met

## 2022-07-08 NOTE — PROGRESS NOTES
[facility] DISCHARGE NOTE    Patient discharged to home with home care referral at 13:30 PM via wheel chair. Accompanied by daughter and staff. Discharge instructions reviewed with patient and daughter, opportunity offered to ask questions. Prescriptions sent to patients preferred pharmacy. All belongings sent with patient.    Mona Juarez RN

## 2022-07-08 NOTE — PROGRESS NOTES
[facility]      OUTPATIENT PHYSICAL THERAPY EVALUATION  PLAN OF TREATMENT FOR OUTPATIENT REHABILITATION  (COMPLETE FOR INITIAL CLAIMS ONLY)  Patient's Last Name, First Name, M.I.  YOB: 1955  Katherine Love                        Provider's Name  [facility] Medical Record No.  [id number]                               Onset Date:  07/08/22   Start of Care Date:  07/08/22      Type:     _X_PT   ___OT   ___SLP Medical Diagnosis:  s/p R TKA                        PT Diagnosis:  impaired mobility s/p R TKA   Visits from SOC:  1   _________________________________________________________________________________  Plan of Treatment/Functional Goals    Planned Interventions: balance training, bed mobility training, cryotherapy, gait training, home exercise program, patient/family education, ROM (range of motion), stair training, strengthening, transfer training     Goals: See Physical Therapy Goals on Care Plan in [employer] electronic health record.    Therapy Frequency: One time eval and treatment only  Predicted Duration of Therapy Intervention: 07/08/22  _________________________________________________________________________________    I CERTIFY THE NEED FOR THESE SERVICES FURNISHED UNDER        THIS PLAN OF TREATMENT AND WHILE UNDER MY CARE     (Physician co-signature of this document indicates review and certification of the therapy plan).                Certification date from: 07/08/22, Certification date to: 07/08/22    Referring Physician: Severo Edmonds MD            Initial Assessment        See Physical Therapy evaluation dated 07/08/22 in [employer] electronic health record.

## 2022-07-08 NOTE — PROGRESS NOTES
Skin affirmation note     Admitting nurse completed full skin assessment, Benjamin score and Benjamin interventions. This writer agrees with the initial skin assessment findings.

## 2022-07-08 NOTE — PROGRESS NOTES
"Patient vital signs are at baseline: Yes  Patient able to ambulate as they were prior to admission or with assist devices provided by therapies during their stay:  Yes  Patient MUST void prior to discharge:  Yes  Patient able to tolerate oral intake:  Yes  Pain has adequate pain control using Oral analgesics:  Yes    /58 (BP Location: Left arm)   Pulse 61   Temp 98.5  F (36.9  C) (Oral)   Resp 15   Ht 1.6 m (5' 3\")   Wt 69.9 kg (154 lb)   LMP 05/08/2005   SpO2 94%   BMI 27.28 kg/m      "

## 2022-07-08 NOTE — PROGRESS NOTES
[facility] Medicine Progress Note  Date of Service: 07/08/2022    Assessment & Plan   Katherine Love is a 66 year old female who presented on 7/7/2022 for scheduled Procedure(s):  Right Total Knee Arthroplasty by Severo Edmonds MD and is being followed by the hospital medicine service for co-management of acute and/or chronic perioperative medical problems.    S/p Procedure(s):  Right Total Knee Arthroplasty   1 Day Post-Op   Hg 12.7. Pain well managed at present.    - pain control, wound cares, physical therapy, occupational therapy and DVT prophylaxis per orthopedic surgery service    Hypertension  Chronic. Blood pressures reviewed, stable.   - continue home amlodipine, metoprolol    Hyperlipidemia  Chronic.  - continue home pravastatin    Paroxysmal supraventricular tachycardia  Previously diagnosed.  - continue home metoprolol    DVT Prophylaxis: as per orthopedic surgery service - aspirin 325 mg daily  Code Status: Full Code    Lines: peripheral   Martinez catheter: none    Discussion: Medically, the patient appears to be recovering appropriately since surgery. Vital signs stable. No medical barriers to discharge at this time.    Disposition: Anticipate discharge today, per ortho if pain and mobility goals are met     Attestation:  I have reviewed today's vital signs, notes, medications, labs and imaging.  Total time: 15 minutes    Nohemy Scott PA-C   [facility] Medicine    Supervising Physician Dr. Daryl Sarmiento.     Interval History   Patient was seen this morning. Pain well managed at present. Activity well tolerated. Good appetite. Passing Gas. No BM. Voiding regularly.    Denies headache, lightheadedness, dizziness, fever, chills, chest pain, palpitations, shortness of breath, cough, abdominal pain, nausea, vomiting, numbness or tingling in bilateral lower extremities.         Physical Exam   Temp:  [98  F (36.7  C)-99.1  F (37.3  C)] 98.3  F (36.8  C)  Pulse:   [57-87] 61  Resp:  [8-21] 18  BP: ()/(46-88) 121/65  SpO2:  [91 %-98 %] 95 %    Weights:   Vitals:    07/07/22 1230   Weight: 69.9 kg (154 lb)    Body mass index is 27.28 kg/m .    General: Appears well, sitting up in bed. Alert and oriented. Pleasant and cooperative. No distress. Non-toxic. Appears stated age.   CV: Regular rate, normal rhythm. Radial pulses are 2+ bilaterally. Pedal pulses are 2+ bilaterally. No lower extremity edema.  Respiratory: No accessory muscle usage. Clear to auscultation bilaterally. No wheezes, crackles or rhonchi.   GI: Bowel sounds normoactive in all quadrants. Soft, non-tender, non-distended.  Skin: Warm, dry, intact. Did not assess incision, bandage appears clean and dry.  Musculoskeletal: Muscle tone is appropriate. Moves all extremities freely with limited range of motion right lower extremity due to pain and surgery.  Neuro: Sensation to light touch of bilateral lower extremities is grossly intact.     Data   Recent Labs   Lab 07/08/22  0529   HGB 12.7   *       Recent Labs   Lab 07/08/22  0529   *        Unresulted Labs Ordered in the Past 30 Days of this Admission     No orders found for last 31 day(s).           Imaging  Recent Results (from the past 24 hour(s))   XR Knee Port Right 1/2 Views    Narrative    Examination:  XR KNEE PORT RIGHT 1/2 VIEWS    Date:  7/7/2022 4:34 PM     Clinical Information: Post-Op Total Knee    Comparison: 9/18/2015..      Impression    Impression:    1.  Completed cemented total knee arthroplasty. Normal joint  alignment. No evidence of periprosthetic fracture or other  complication. Postoperative air in the joint space and stranding soft  tissues.    FLAKITA SILVESTRE MD         SYSTEM ID:  [id number]      I reviewed all new labs and imaging results over the last 24 hours. I personally reviewed no images or EKG's today.    Medications     lactated ringers Stopped (07/07/22 2138)       acetaminophen  975 mg Oral Q8H      amLODIPine  5 mg Oral Daily     aspirin  325 mg Oral Daily     ketorolac  15 mg Intravenous Q6H     metoprolol tartrate  50 mg Oral At Bedtime     polyethylene glycol  17 g Oral Daily     pravastatin  80 mg Oral QPM     senna-docusate  1 tablet Oral BID     sodium chloride (PF)  3 mL Intracatheter Q8H

## 2022-07-08 NOTE — DISCHARGE SUMMARY
[facility] Orthopedics Discharge Summary                                  [facility]     VEENA CARTWRIGHT [id number]   Age: 66 year old  PCP: Luci Silver, [phone] 1955     Date of Admission:  7/7/2022  Date of Discharge::  7/8/2022  Discharge Physician:  Doug Delgado PA-C    Code status:  Full Code    Admission Information:  Admission Diagnosis:  Arthritis of right knee [M17.11]  Status post total knee replacement [Z96.659]    Post-Operative Day: 1 Day Post-Op     Reason for admission:  The patient was admitted for the following:Procedure(s) (LRB):  Right Total Knee Arthroplasty (Right)    Active Problems:    Essential hypertension, benign    Paroxysmal supraventricular tachycardia (H)    Tobacco use disorder    Hyperlipidemia LDL goal <130    Status post total knee replacement      Allergies:  Patient has no known allergies.    Following the procedure noted above the patient was transferred to the post-op floor and started on:    Therapy:  physical therapy  Anticoagulation Plan: ASA 325mg daily for 30 days  Pain Management: oxycodone and tylenol  Weight bearing status: Weight bearing as tolerated     The patient was followed and co-managed by the hospitalist service during the inpatient treatment course  Complications:  None  Consultations:  None     Pertinent Labs   Lab Results: personally reviewed.     Recent Labs   Lab Test 07/08/22  0529 02/13/19  1324 01/23/19  1044 01/15/19  1741 01/14/19  1849   HGB 12.7  --   --   --  16.9*   HCT  --   --   --   --  48.5*   MCV  --   --   --   --  87   PLT  --   --   --   --  287   NA  --  138 137 139 134          Discharge Information:  Condition at discharge: Stable  Discharge destination:  Admitted to home care:   Agency: [facility]  Discharged to home     Medications at discharge:  Current Discharge Medication List      START taking these medications    Details   acetaminophen (TYLENOL) 325 MG tablet Take 2 tablets (650 mg) by mouth  every 4 hours as needed for other (mild pain)  Qty: 100 tablet, Refills: 0    Associated Diagnoses: Status post total right knee replacement      aspirin (ASA) 325 MG EC tablet Take 1 tablet (325 mg) by mouth daily  Qty: 30 tablet, Refills: 0    Associated Diagnoses: Status post total right knee replacement      hydrOXYzine (VISTARIL) 25 MG capsule Take 1 capsule (25 mg) by mouth 3 times daily as needed for itching  Qty: 30 capsule, Refills: 0    Associated Diagnoses: Status post total right knee replacement      oxyCODONE (ROXICODONE) 5 MG tablet Take 1-2 tablets (5-10 mg) by mouth every 4 hours as needed for moderate to severe pain  Qty: 33 tablet, Refills: 0    Associated Diagnoses: Status post total right knee replacement      senna-docusate (SENOKOT-S/PERICOLACE) 8.6-50 MG tablet Take 1-2 tablets by mouth daily as needed for constipation Take while on oral narcotics to prevent or treat constipation.  Qty: 15 tablet, Refills: 0    Comments: While taking narcotics  Associated Diagnoses: Status post total right knee replacement         CONTINUE these medications which have NOT CHANGED    Details   amLODIPine (NORVASC) 5 MG tablet Take 1 tablet (5 mg) by mouth daily  Qty: 90 tablet, Refills: 3    Associated Diagnoses: Essential hypertension, benign      Cholecalciferol 100 MCG (4000 UT) CAPS       metoprolol tartrate (LOPRESSOR) 50 MG tablet TAKE ONE TABLET BY MOUTH EVERY NIGHT AT BEDTIME  Qty: 90 tablet, Refills: 3    Associated Diagnoses: Essential hypertension, benign      pravastatin (PRAVACHOL) 80 MG tablet Take one by mouth daily.  Qty: 90 tablet, Refills: 3    Associated Diagnoses: Hyperlipidemia LDL goal <130      vitamin B-12 (CYANOCOBALAMIN) 1000 MCG tablet Take 1,000 mcg by mouth                        Follow-Up Care:  Patient should be seen in the office in 14 days by the Orthopedic Surgeon/Physician Assistant.  Call [phone] for appointment or questions.    Doug Delgado PA-C

## 2022-07-08 NOTE — CONSULTS
Care Management Note:     Care Management team received referral from Ortho team to assist pt with Home Care services post surgical services.     Per IDT rounds, EMR review, and discussion with PT/OT staff, it has been determined that pt will discharge to home with pre-planned [facility]--PT cares.      AYSE met at bedside with pt and discussed home care services, Medicare homebound status and initial visit/call.    Pt received call from home care and is aware of their arrival time tomorrow.     Daughter will be available to transport at time of discharge.     AYSE informed Ortho PA to writer home care orders & updated [facility]  of discharge orders.    [facility]  Main # [phone]  Fax: [phone]    LAILA Dyer  [facility] [phone]

## 2022-08-20 NOTE — PROGRESS NOTES
"[facility] Orthopaedics Progress Note      Post-operative Day: 1 Day Post-Op    Procedure(s):  Right Total Knee Arthroplasty  Subjective:    Pt reports mild pain in the right knee but otherwise she is doing well. She hopes to go home later today and has home PT set up to start tomorrow.     Chest pain, SOB:  No  Nausea, vomiting:  No  Lightheadedness, dizziness:  No  Neuro:  Patient denies new onset numbness or paresthesias      Objective:  Blood pressure 125/66, pulse 63, temperature 98.3  F (36.8  C), temperature source Oral, resp. rate 18, height 1.6 m (5' 3\"), weight 69.9 kg (154 lb), last menstrual period 05/08/2005, SpO2 91 %.    Patient Vitals for the past 24 hrs:   BP Temp Temp src Pulse Resp SpO2 Height Weight   07/08/22 0719 125/66 98.3  F (36.8  C) Oral 63 18 91 % -- --   07/08/22 0320 110/58 98.5  F (36.9  C) Oral 61 15 94 % -- --   07/07/22 2220 126/53 98.3  F (36.8  C) Oral 68 21 93 % -- --   07/07/22 2104 131/62 -- -- 87 19 92 % -- --   07/07/22 1938 137/70 -- -- 58 18 95 % -- --   07/07/22 1830 -- -- -- -- 16 97 % -- --   07/07/22 1825 117/75 98  F (36.7  C) Oral 59 18 97 % -- --   07/07/22 1804 124/52 98.4  F (36.9  C) Oral 77 16 97 % -- --   07/07/22 1735 -- -- -- 64 -- 98 % -- --   07/07/22 1720 100/46 98  F (36.7  C) Oral 57 16 98 % -- --   07/07/22 1656 -- -- -- -- -- 98 % -- --   07/07/22 1645 98/64 98.6  F (37  C) Oral 64 10 98 % -- --   07/07/22 1630 113/66 -- -- 60 13 97 % -- --   07/07/22 1615 105/69 -- -- 62 16 95 % -- --   07/07/22 1600 113/67 -- -- 68 8 92 % -- --   07/07/22 1551 108/70 99.1  F (37.3  C) Oral 73 14 92 % -- --   07/07/22 1230 (!) 140/88 98.3  F (36.8  C) Oral 75 16 95 % 1.6 m (5' 3\") 69.9 kg (154 lb)       Wt Readings from Last 4 Encounters:   07/07/22 69.9 kg (154 lb)   01/23/19 69.9 kg (154 lb)   01/15/19 68 kg (150 lb)   01/14/19 68 kg (150 lb)       Gen: A&O x 3. NAD. Appears comfortable.  Wound status: Covered, Aquacel dressing is c/d/i.  Circulation, motion and " 18-Aug-2022 sensation: Dorsiflexion/plantarflexion intact and equal bilaterally; distal lower extremity sensation is intact and equal bilaterally. Foot and toes are warm and well perfused.    Swelling: Mild  Calf tenderness: calves are soft and non-tender bilaterally     Pertinent Labs   Lab Results: personally reviewed.     Recent Labs   Lab Test 07/08/22  0529 02/13/19  1324 01/23/19  1044 01/15/19  1741 01/14/19  1849   HGB 12.7  --   --   --  16.9*   HCT  --   --   --   --  48.5*   MCV  --   --   --   --  87   PLT  --   --   --   --  287   NA  --  138 137 139 134       Plan:   Continue current cares and rehabilitation.  Anticoagulation protocol: ASA 325mg daily  x 30  days  Pain medications: oxycodone, toradol and tylenol  Weight bearing status:  WBAT  Disposition:  Plan for discharge to home with home health care when medically stable and pain is controlled, cleared by therapy. Likely later today.              Report completed by:  Doug Delgado PA-C  Date: 7/8/2022  Time: 8:47 AM     20-Aug-2022 14:37 19-Aug-2022 13:29 19-Aug-2022 18:02

## 2023-06-19 NOTE — H&P (VIEW-ONLY)
[facility]      Preoperative Consultation   Katherine Love   : 1955   Gender: female    Date of Encounter: 2023    Nursing Notes:   Theresa Ross  2023  9:50 AM  Sign at exiting of workspace  Pre-op exam for LKA  Date of Surgery: 2023  Surgeon: Dr Severo Edmonds    Surgical Specialty: Orthopedic/Spine  Severo Edmonds MD - [facility]  Hospital/Surgical Facility: [facility]  Hospital Fax Number: [phone]  Surgery type: inpatient  Primary Physician: Purvi May      Risk Factors  Does the patient have:  Asthma no  CAD no  Renal insufficiency no  Diabetes no  CHF no  Recent MI no  Valvular heart disease no    Medication Review  Is the patient currently taking:  Antihistamines no  Aspirin or ibuprofen products no  Blood thinners no   If YES, Does patient have to hold medication?   Metformin no  Seizure medication no    Bleeding Risk  Does the patient have a history of:  Bleeding or easy bruising no  Bleeding into the joints or muscles no  Frequent nosebleeds no    Did the patient have difficulty with bleeding after:  Loss of teeth or dental extractions no  Previous surgery no  Previous injury no    Is there a family history of:  A blood or bleeding disorder no  Blood transfusion no   Malignant Hyperthermia no  Adverse reactions to anesthesia no    Anesthesia Risk  Does the patient have a history of:  Difficulty waking up from anesthesia no  Excessive postop nausea and vomiting no  Sensitivity to narcotics no  Specific sensitivity to anesthesia no  Sleep apnea no    Theresa MANE CMA/LSXO         2023 9:50 AM               History of Present Illness   Pt is here for a pre-op exam for  LKA on 2023 with Dr Severo Edmonds at  [facility]     Review of Systems   A comprehensive review of systems was negative except for items noted in HPI.    Patient Active Problem List    Diagnosis Code     Hypertension I10     Hyperlipidemia E78.5     Degeneration of lumbar or lumbosacral intervertebral disc M51.37     Paroxysmal supraventricular tachycardia (HC) I47.1     Mitral valve disorder I05.9     Colon cancer screening Z12.11     Cataract, nuclear sclerotic, right eye H25.11     Cataract, nuclear sclerotic, left eye H25.12     Glaucoma suspect of both eyes H40.003     Current Outpatient Medications   Medication Sig     acetaminophen (TYLENOL) 325 mg tablet Take 650 mg by mouth each time if needed.     amLODIPine (NORVASC) 5 mg tablet Take 1 Tablet (5 mg) by mouth once daily.     cholecalciferol, vitamin D3, 100 mcg (4,000 unit) cap Take by mouth.     cyanocobalamin (Vitamin B-12) 1,000 mcg tablet Take 1 Tablet (1,000 mcg) by mouth once daily.     cyclobenzaprine (FLEXERIL) 10 mg tablet Take 1 Tablet (10 mg) by mouth 3 times daily if needed for Muscle Spasm.     metoprolol tartrate (LOPRESSOR) 50 mg tablet Take 1 Tablet (50 mg) by mouth once daily.     oxyCODONE (ROXICODONE) 5 mg immediate release tablet      pravastatin (PRAVACHOL) 80 mg tablet Take 1 Tablet (80 mg) by mouth once daily.     traZODone (DESYREL) 50 mg tablet Take 1-2 tablets at bedtime as needed for insomnia     No current facility-administered medications for this visit.   No Known Allergies  Past Surgical History:   . Laterality Date     (IA) AL COLONOSCOPY REMOVE JONATHAN POLYP LESN SNARE  11/03/2020    tubular adenoma, follow up 5 years     HYSTERECTOMY  05/04/2011     KNEE REPLACEMENT Right 07/07/2022     Social History     Tobacco Use     Smoking status: Some Days     Current packs/day: 0.00     Types: Cigarettes     Smokeless tobacco: Never     Tobacco comments:     2 packs weekly   Vaping Use     Vaping Use: Never used   Substance Use Topics     Alcohol use: Yes     Comment: occansional      Drug use: Never     Family History   Problem Relation Age of Onset     Abnormal EKG Mother      Aneurysm Mother      Cancer-breast  "No Family History        PAST DIFFICULTY WITH ANESTHESIA: None     Physical Exam   /76 (Cuff Site: Left Arm, Position: Sitting, Cuff Size: Adult Large)   Pulse 72   Temp 98.6  F (37  C) (Oral)   Resp 16   Ht 1.6 m (5' 3\")   Wt 68 kg (150 lb)   BMI 26.57 kg/m   Body mass index is 26.57 kg/m .  General Appearance: Pleasant, alert, appropriate appearance for age. No acute distress  Head Exam: Normal. Normocephalic, atraumatic.  Eye Exam:  Normal external eye, conjunctiva, lids, cornea. KYLIE.  Ear Exam: Normal TM's bilaterally. Normal auditory canals and external ears. Non-tender.  Nose Exam: Normal external nose, mucus membranes, and septum.  OroPharynx Exam:  Dental hygiene adequate. Normal buccal mucosa. Normal pharynx.  Neck Exam:  Supple, no masses or nodes.  Thyroid Exam: No nodules or enlargement.  Chest/Respiratory Exam: Normal chest wall and respirations. Clear to auscultation.  Cardiovascular Exam: Regular rate and rhythm. S1, S2, no murmur, click, gallop, or rubs.  Gastrointestinal Exam: Soft, non-tender, no masses or organomegaly.  Lymphatic Exam: Non-palpable nodes in neck or clavicular regions.  Musculoskeletal Exam: Back is straight and non-tender, full ROM of upper extremities. Pt is ambulating with a walker. There is painful ROM at the L knee and inability to completely extend the joint.   Skin: no rash or abnormalities  Neurologic Exam: Nonfocal, symmetric DTRs, normal gross motor, tone coordination and no tremor.  Psychiatric Exam: Alert and oriented - appropriate affect.         Assessment / Plan     Labs:  Results for orders placed or performed in visit on 06/19/23   HEMOGLOBIN   Result Value Ref Range Status    HEMOGLOBIN                15.2 12.0 - 16.0 g/dL Final    MCV                       87 81 - 99 fL Final       ECG: Not indicated.     1. Pre-op exam  2. Primary osteoarthritis of left knee  This patient has no active cardiac conditions or clinical risk factors that would preclude " her from surgery. For above listed surgery and anesthesia: Patient is low risk for perioperative complications. I recommend that patient proceed with surgery. Pt instructed to continue amlodipine on the morning of surgery.   - HEMOGLOBIN; Future              I, Hedy Mckeon am serving as a Scribe to document services personally performed by Dr. Sparks based on my observation of the services provided and the practitioner's statements to me.     I, OLVIN SPARKS MD personally performed the services described in this documentation. All medical record entries made by the scribe were at my direction and in my presence.  I have reviewed the chart and discharge instructions (if applicable) and agree that the record reflects my personal performance and is accurate and complete.

## 2023-06-20 NOTE — PROVIDER NOTIFICATION
"   06/20/23 1324   General Information   Contact made? Yes   Which attempt is this? 2   Call date:  06/20/23   Call time: 1324   Communication Assessment   Patient / Family communication style spoken language (English or Bilingual)   Pre-op Phone Call   How to be Addressed wicho   Primary Caregiver Self   Notified of Need to Arrange Ride and Caregiver Yes   Stated Reason for Admission Left knee   Surgery Time Verified Yes   Arrival Time Verified 1300   Facility Location Verified Yes   NPO Status Reinforced Yes   Solids 0500   Clear Liquids 1100   Physician's Name [facility]   Patient Knows to Bring List of Pre-op Medications Yes   Patient Reminded to Bring Eye Drops for Day of Surgery N/A   Do you have any of the following Medical Devices? Not Applicable   Patient/Family states an understanding of: Pre-op shower with antiseptic soap x2 instructions;Removing jewelry/ body piercings before surgery;Need to bring insurance card;Plan/Resources/Equipment needed for discharge;No cosmetics including fragrances;Need to leave valuables at home;Reviewed visitor policy   Pre-op Implants (USE \"DEVICES\" GROUP TO DOCUMENT ASSESSMENT OF IMPLANT ON DOS)   Electronic Implant No   Advance Directives   Scanned docmt in ACP Activity? No scanned doc   Pt confirms current doc scanned Pt states no documents   Pt offered more information Pt declined   Malnutrition Screening Tool (MST)   Have you recently lost weight without trying? 0   Have you been eating poorly because of a decreased appetite? 0   Spiritual Beliefs   May our Spiritual Health Services staff meet with you or contact someone on your behalf? not at this time   Disability/Function   Hearing Difficulty or Deaf no   Wear Glasses or Blind no   Difficulty Communicating no   Difficulty Eating/Swallowing no   Walking or Climbing Stairs Difficulty no   Dressing/Bathing Difficulty no   Toileting issues no   Doing Errands Independently Difficulty (such as shopping) no   Equipment Currently " Used at Home walker, standard   Fall history within last six months no   Change in Functional Status Since Onset of Current Illness/Injury no   Discharge Planning   People in Home spouse   Support Systems Spouse/Significant Other   Type of Residence Private Residence   Patient/Family Anticipates Transition to home   Coping/Stress/Tolerance   Major Change/Loss/Stressor/Fears denies

## 2023-06-20 NOTE — PROVIDER NOTIFICATION
06/20/23 1326   Discharge Planning   Patient/Family Anticipates Transition to home   Concerns to be Addressed all concerns addressed in this encounter   Living Arrangements   People in Home spouse   Type of Residence Private Residence   Is your private residence a single family home or apartment? Single family home   Number of Stairs, Within Home, Primary five   Stair Railings, Within Home, Primary railings safe and in good condition   Once home, are you able to live on one level? Yes   Which level? Main Level   Bathroom Shower/Tub Tub/Shower unit   Equipment Currently Used at Home walker, standard   Support System   Support Systems Spouse/Significant Other   Do you have someone available to stay with you one or two nights once you are home? No   Medical Clearance   It is recommended that you call and check with any specialty providers before surgery to see if you need surgical clearance.  Do you see any specialty providers outside of your primary care provider? No   Blood   Known Bleeding Disorder or Coagulopathy No   Does the patient have any Rastafarian/cultural preferences related to blood products? No   Education   Has the patient scheduled or completed pre-op total joint education, either in class or online, in the last 12 months? No

## 2023-06-26 ENCOUNTER — ANESTHESIA EVENT (OUTPATIENT)
Dept: SURGERY | Facility: CLINIC | Age: 68
End: 2023-06-26
Payer: COMMERCIAL

## 2023-06-26 ASSESSMENT — ENCOUNTER SYMPTOMS: DYSRHYTHMIAS: 1

## 2023-06-26 ASSESSMENT — LIFESTYLE VARIABLES: TOBACCO_USE: 1

## 2023-06-26 NOTE — ANESTHESIA PREPROCEDURE EVALUATION
Anesthesia Pre-Procedure Evaluation    Patient: Katherine Love   MRN: [id number] : 1955        Procedure : Procedure(s):  total Knee Arthroplasty          Past Medical History:   Diagnosis Date     Acute sinusitis, unspecified      Anemia, unspecified      Bronchitis, not specified as acute or chronic      Excessive or frequent menstruation      Intramural leiomyoma of uterus      Other disorder of menstruation and other abnormal bleeding from female genital tract     DUB     TOBACCO ABUSE-CONTINUOUS      Unspecified otitis media       Past Surgical History:   Procedure Laterality Date     ARTHROPLASTY KNEE Right 2022    Procedure: Right Total Knee Arthroplasty;  Surgeon: Severo Edmonds MD;  Location: [facility] OR      TREATMENT MISSED  SURG, 1ST TRIMESTER       HYSTERECTOMY, PAP NO LONGER INDICATED  05     ZZC LIGATE FALLOPIAN TUBE,POSTPARTUM       ZZC PART REMOVAL COLON W ANASTOMOSIS      meckels diverticulum     ZZ REPAIR UMBILICAL RAMA,5+Y/O,REDUC        No Known Allergies   Social History     Tobacco Use     Smoking status: Some Days     Packs/day: 0.20     Years: 41.00     Pack years: 8.20     Types: Cigarettes     Smokeless tobacco: Never     Tobacco comments:     1 pack per week   Substance Use Topics     Alcohol use: Yes     Alcohol/week: 12.0 standard drinks of alcohol     Types: 12 Cans of beer per week     Comment: occasional      Wt Readings from Last 1 Encounters:   22 69.9 kg (154 lb)        Anesthesia Evaluation   Pt has had prior anesthetic.         ROS/MED HX  ENT/Pulmonary:     (+) tobacco use, Current use,     Neurologic:  - neg neurologic ROS     Cardiovascular:     (+) Dyslipidemia hypertension-----dysrhythmias (Paroxysmal supraventricular tachycardia), Other, valvular problems/murmurs type: MVP Previous cardiac testing   Echo: Date: Results:    Stress Test: Date: Results:    ECG Reviewed: Date: 22 Results:  Sinus bradycardia  Cath: Date:  Results:      METS/Exercise Tolerance:     Hematologic:     (+) anemia,     Musculoskeletal: Comment: Degeneration of lumbar or lumbosacral intervertebral disc  (+) arthritis,     GI/Hepatic:  - neg GI/hepatic ROS     Renal/Genitourinary:  - neg Renal ROS     Endo:  - neg endo ROS     Psychiatric/Substance Use:     (+) alcohol abuse     Infectious Disease:  - neg infectious disease ROS     Malignancy:       Other:            Physical Exam    Airway  airway exam normal      Mallampati: II   TM distance: > 3 FB   Neck ROM: full   Mouth opening: > 3 cm    Respiratory Devices and Support         Dental       (+) Completely normal teeth      Cardiovascular   cardiovascular exam normal          Pulmonary   pulmonary exam normal        breath sounds clear to auscultation           OUTSIDE LABS:  CBC:   Lab Results   Component Value Date    WBC 11.0 01/14/2019    WBC 7.2 06/26/2013    HGB 12.7 07/08/2022    HGB 16.9 (H) 01/14/2019    HCT 48.5 (H) 01/14/2019    HCT 49.0 06/26/2013     01/14/2019     06/26/2013     BMP:   Lab Results   Component Value Date     02/13/2019     01/23/2019    POTASSIUM 4.0 02/13/2019    POTASSIUM 4.1 01/23/2019    CHLORIDE 105 02/13/2019    CHLORIDE 108 01/23/2019    CO2 27 02/13/2019    CO2 25 01/23/2019    BUN 8 02/13/2019    BUN 9 01/23/2019    CR 0.65 02/13/2019    CR 0.50 (L) 01/23/2019     (H) 07/08/2022    GLC 83 02/13/2019     COAGS: No results found for: PTT, INR, FIBR  POC: No results found for: BGM, HCG, HCGS  HEPATIC:   Lab Results   Component Value Date    ALBUMIN 4.1 01/14/2019    PROTTOTAL 7.9 01/14/2019    ALT 30 01/14/2019    AST 16 01/14/2019    ALKPHOS 119 01/14/2019    BILITOTAL 0.3 01/14/2019     OTHER:   Lab Results   Component Value Date    JODY 8.9 02/13/2019    LIPASE 115 05/03/2005    AMYLASE 46 05/03/2005    TSH 1.31 01/14/2019    T4 0.98 02/14/2007    SED 7 05/11/2006       Anesthesia Plan    ASA Status:  3   NPO Status:  NPO Appropriate     Anesthesia Type: Spinal.      Maintenance: TIVA.        Consents    Anesthesia Plan(s) and associated risks, benefits, and realistic alternatives discussed. Questions answered and patient/representative(s) expressed understanding.    - Discussed:     - Discussed with:  Patient         Postoperative Care    Pain management: Peripheral nerve block (Single Shot), Multi-modal analgesia.   PONV prophylaxis: Ondansetron (or other 5HT-3), Dexamethasone or Solumedrol     Comments:                HARRIET Mcneal CRNA

## 2023-06-27 ENCOUNTER — HOSPITAL ENCOUNTER (OUTPATIENT)
Facility: CLINIC | Age: 68
Discharge: HOME | End: 2023-06-28
Attending: ORTHOPAEDIC SURGERY | Admitting: ORTHOPAEDIC SURGERY
Payer: COMMERCIAL

## 2023-06-27 ENCOUNTER — ANCILLARY PROCEDURE (OUTPATIENT)
Dept: ULTRASOUND IMAGING | Facility: CLINIC | Age: 68
End: 2023-06-27
Payer: COMMERCIAL

## 2023-06-27 ENCOUNTER — ANESTHESIA (OUTPATIENT)
Dept: SURGERY | Facility: CLINIC | Age: 68
End: 2023-06-27
Payer: COMMERCIAL

## 2023-06-27 ENCOUNTER — APPOINTMENT (OUTPATIENT)
Dept: GENERAL RADIOLOGY | Facility: CLINIC | Age: 68
End: 2023-06-27
Attending: ORTHOPAEDIC SURGERY
Payer: COMMERCIAL

## 2023-06-27 DIAGNOSIS — Z96.652 STATUS POST TOTAL LEFT KNEE REPLACEMENT: Primary | ICD-10-CM

## 2023-06-27 PROBLEM — Z96.659 S/P KNEE REPLACEMENT: Status: ACTIVE | Noted: 2023-06-27

## 2023-06-27 PROCEDURE — 272N000001 HC OR GENERAL SUPPLY STERILE: Performed by: ORTHOPAEDIC SURGERY

## 2023-06-27 PROCEDURE — 250N000011 HC RX IP 250 OP 636: Performed by: NURSE ANESTHETIST, CERTIFIED REGISTERED

## 2023-06-27 PROCEDURE — 271N000001 HC OR GENERAL SUPPLY NON-STERILE: Performed by: ORTHOPAEDIC SURGERY

## 2023-06-27 PROCEDURE — 250N000009 HC RX 250: Performed by: NURSE ANESTHETIST, CERTIFIED REGISTERED

## 2023-06-27 PROCEDURE — 370N000017 HC ANESTHESIA TECHNICAL FEE, PER MIN: Performed by: ORTHOPAEDIC SURGERY

## 2023-06-27 PROCEDURE — 250N000013 HC RX MED GY IP 250 OP 250 PS 637: Performed by: NURSE ANESTHETIST, CERTIFIED REGISTERED

## 2023-06-27 PROCEDURE — 250N000013 HC RX MED GY IP 250 OP 250 PS 637

## 2023-06-27 PROCEDURE — 999N000141 HC STATISTIC PRE-PROCEDURE NURSING ASSESSMENT: Performed by: ORTHOPAEDIC SURGERY

## 2023-06-27 PROCEDURE — 258N000003 HC RX IP 258 OP 636: Performed by: NURSE ANESTHETIST, CERTIFIED REGISTERED

## 2023-06-27 PROCEDURE — 258N000003 HC RX IP 258 OP 636: Performed by: ORTHOPAEDIC SURGERY

## 2023-06-27 PROCEDURE — C1776 JOINT DEVICE (IMPLANTABLE): HCPCS | Performed by: ORTHOPAEDIC SURGERY

## 2023-06-27 PROCEDURE — 250N000013 HC RX MED GY IP 250 OP 250 PS 637: Performed by: ORTHOPAEDIC SURGERY

## 2023-06-27 PROCEDURE — 710N000009 HC RECOVERY PHASE 1, LEVEL 1, PER MIN: Performed by: ORTHOPAEDIC SURGERY

## 2023-06-27 PROCEDURE — 250N000011 HC RX IP 250 OP 636: Mod: JZ | Performed by: ORTHOPAEDIC SURGERY

## 2023-06-27 PROCEDURE — 250N000011 HC RX IP 250 OP 636

## 2023-06-27 PROCEDURE — 250N000009 HC RX 250: Performed by: ORTHOPAEDIC SURGERY

## 2023-06-27 PROCEDURE — 360N000077 HC SURGERY LEVEL 4, PER MIN: Performed by: ORTHOPAEDIC SURGERY

## 2023-06-27 PROCEDURE — 999N000065 XR KNEE PORT LEFT 1/2 VIEWS: Mod: LT

## 2023-06-27 PROCEDURE — C1713 ANCHOR/SCREW BN/BN,TIS/BN: HCPCS | Performed by: ORTHOPAEDIC SURGERY

## 2023-06-27 DEVICE — IMP COMP PATELLA GENESIS II 13X29MM 71420574: Type: IMPLANTABLE DEVICE | Site: KNEE | Status: FUNCTIONAL

## 2023-06-27 DEVICE — IMP COMP FEM S&N LEGION OXIN NARROW CR SZ5N LT 71421245: Type: IMPLANTABLE DEVICE | Site: KNEE | Status: FUNCTIONAL

## 2023-06-27 DEVICE — BONE CEMENT RADIOPAQUE SIMPLEX HV FULL DOSE 6194-1-001: Type: IMPLANTABLE DEVICE | Site: KNEE | Status: FUNCTIONAL

## 2023-06-27 DEVICE — IMPLANTABLE DEVICE: Type: IMPLANTABLE DEVICE | Site: KNEE | Status: FUNCTIONAL

## 2023-06-27 RX ORDER — HYDROMORPHONE HCL IN WATER/PF 6 MG/30 ML
0.2 PATIENT CONTROLLED ANALGESIA SYRINGE INTRAVENOUS
Status: DISCONTINUED | OUTPATIENT
Start: 2023-06-27 | End: 2023-06-28 | Stop reason: HOSPADM

## 2023-06-27 RX ORDER — PROCHLORPERAZINE MALEATE 5 MG
5 TABLET ORAL EVERY 6 HOURS PRN
Status: DISCONTINUED | OUTPATIENT
Start: 2023-06-27 | End: 2023-06-28 | Stop reason: HOSPADM

## 2023-06-27 RX ORDER — TRAZODONE HYDROCHLORIDE 50 MG/1
1-2 TABLET, FILM COATED ORAL
COMMUNITY
Start: 2023-04-10

## 2023-06-27 RX ORDER — FENTANYL CITRATE 50 UG/ML
25 INJECTION, SOLUTION INTRAMUSCULAR; INTRAVENOUS EVERY 5 MIN PRN
Status: DISCONTINUED | OUTPATIENT
Start: 2023-06-27 | End: 2023-06-27 | Stop reason: HOSPADM

## 2023-06-27 RX ORDER — TRANEXAMIC ACID 650 MG/1
1950 TABLET ORAL ONCE
Status: DISCONTINUED | OUTPATIENT
Start: 2023-06-27 | End: 2023-06-27 | Stop reason: HOSPADM

## 2023-06-27 RX ORDER — ASPIRIN 325 MG
325 TABLET, DELAYED RELEASE (ENTERIC COATED) ORAL DAILY
Status: DISCONTINUED | OUTPATIENT
Start: 2023-06-28 | End: 2023-06-28 | Stop reason: HOSPADM

## 2023-06-27 RX ORDER — DEXAMETHASONE SODIUM PHOSPHATE 10 MG/ML
INJECTION, SOLUTION INTRAMUSCULAR; INTRAVENOUS
Status: DISCONTINUED | OUTPATIENT
Start: 2023-06-27 | End: 2023-06-27

## 2023-06-27 RX ORDER — BUPIVACAINE HYDROCHLORIDE 5 MG/ML
INJECTION, SOLUTION PERINEURAL PRN
Status: DISCONTINUED | OUTPATIENT
Start: 2023-06-27 | End: 2023-06-27 | Stop reason: HOSPADM

## 2023-06-27 RX ORDER — SODIUM CHLORIDE, SODIUM LACTATE, POTASSIUM CHLORIDE, CALCIUM CHLORIDE 600; 310; 30; 20 MG/100ML; MG/100ML; MG/100ML; MG/100ML
INJECTION, SOLUTION INTRAVENOUS CONTINUOUS
Status: DISCONTINUED | OUTPATIENT
Start: 2023-06-27 | End: 2023-06-27 | Stop reason: HOSPADM

## 2023-06-27 RX ORDER — LIDOCAINE 40 MG/G
CREAM TOPICAL
Status: DISCONTINUED | OUTPATIENT
Start: 2023-06-27 | End: 2023-06-27 | Stop reason: HOSPADM

## 2023-06-27 RX ORDER — HYDROMORPHONE HCL IN WATER/PF 6 MG/30 ML
0.4 PATIENT CONTROLLED ANALGESIA SYRINGE INTRAVENOUS
Status: DISCONTINUED | OUTPATIENT
Start: 2023-06-27 | End: 2023-06-28 | Stop reason: HOSPADM

## 2023-06-27 RX ORDER — OXYCODONE HYDROCHLORIDE 5 MG/1
5 TABLET ORAL EVERY 4 HOURS PRN
Status: DISCONTINUED | OUTPATIENT
Start: 2023-06-27 | End: 2023-06-28 | Stop reason: HOSPADM

## 2023-06-27 RX ORDER — AMLODIPINE BESYLATE 5 MG/1
5 TABLET ORAL DAILY
Status: DISCONTINUED | OUTPATIENT
Start: 2023-06-28 | End: 2023-06-28 | Stop reason: HOSPADM

## 2023-06-27 RX ORDER — CEFAZOLIN SODIUM/WATER 2 G/20 ML
2 SYRINGE (ML) INTRAVENOUS SEE ADMIN INSTRUCTIONS
Status: DISCONTINUED | OUTPATIENT
Start: 2023-06-27 | End: 2023-06-27 | Stop reason: HOSPADM

## 2023-06-27 RX ORDER — ONDANSETRON 2 MG/ML
4 INJECTION INTRAMUSCULAR; INTRAVENOUS EVERY 6 HOURS PRN
Status: DISCONTINUED | OUTPATIENT
Start: 2023-06-27 | End: 2023-06-28 | Stop reason: HOSPADM

## 2023-06-27 RX ORDER — HYDROXYZINE HYDROCHLORIDE 25 MG/1
25 TABLET, FILM COATED ORAL EVERY 6 HOURS PRN
Qty: 33 TABLET | Refills: 0 | Status: SHIPPED | OUTPATIENT
Start: 2023-06-27

## 2023-06-27 RX ORDER — PROPOFOL 10 MG/ML
INJECTION, EMULSION INTRAVENOUS CONTINUOUS PRN
Status: DISCONTINUED | OUTPATIENT
Start: 2023-06-27 | End: 2023-06-27

## 2023-06-27 RX ORDER — EPHEDRINE SULFATE 50 MG/ML
INJECTION, SOLUTION INTRAMUSCULAR; INTRAVENOUS; SUBCUTANEOUS PRN
Status: DISCONTINUED | OUTPATIENT
Start: 2023-06-27 | End: 2023-06-27

## 2023-06-27 RX ORDER — POLYETHYLENE GLYCOL 3350 17 G/17G
17 POWDER, FOR SOLUTION ORAL DAILY
Status: DISCONTINUED | OUTPATIENT
Start: 2023-06-28 | End: 2023-06-28 | Stop reason: HOSPADM

## 2023-06-27 RX ORDER — AMOXICILLIN 250 MG
1-2 CAPSULE ORAL 2 TIMES DAILY
Qty: 30 TABLET | Refills: 0 | Status: SHIPPED | OUTPATIENT
Start: 2023-06-27

## 2023-06-27 RX ORDER — HYDROMORPHONE HCL IN WATER/PF 6 MG/30 ML
0.2 PATIENT CONTROLLED ANALGESIA SYRINGE INTRAVENOUS EVERY 5 MIN PRN
Status: DISCONTINUED | OUTPATIENT
Start: 2023-06-27 | End: 2023-06-27 | Stop reason: HOSPADM

## 2023-06-27 RX ORDER — NALOXONE HYDROCHLORIDE 0.4 MG/ML
0.4 INJECTION, SOLUTION INTRAMUSCULAR; INTRAVENOUS; SUBCUTANEOUS
Status: DISCONTINUED | OUTPATIENT
Start: 2023-06-27 | End: 2023-06-28 | Stop reason: HOSPADM

## 2023-06-27 RX ORDER — ACETAMINOPHEN 325 MG/1
650 TABLET ORAL EVERY 4 HOURS PRN
Status: DISCONTINUED | OUTPATIENT
Start: 2023-06-30 | End: 2023-06-28 | Stop reason: HOSPADM

## 2023-06-27 RX ORDER — ASPIRIN 325 MG
325 TABLET, DELAYED RELEASE (ENTERIC COATED) ORAL DAILY
Qty: 30 TABLET | Refills: 0 | Status: SHIPPED | OUTPATIENT
Start: 2023-06-27

## 2023-06-27 RX ORDER — ONDANSETRON 2 MG/ML
4 INJECTION INTRAMUSCULAR; INTRAVENOUS EVERY 30 MIN PRN
Status: DISCONTINUED | OUTPATIENT
Start: 2023-06-27 | End: 2023-06-27 | Stop reason: HOSPADM

## 2023-06-27 RX ORDER — ALBUTEROL SULFATE 0.83 MG/ML
2.5 SOLUTION RESPIRATORY (INHALATION) EVERY 4 HOURS PRN
Status: DISCONTINUED | OUTPATIENT
Start: 2023-06-27 | End: 2023-06-27 | Stop reason: HOSPADM

## 2023-06-27 RX ORDER — CEFAZOLIN SODIUM 1 G/3ML
1 INJECTION, POWDER, FOR SOLUTION INTRAMUSCULAR; INTRAVENOUS EVERY 8 HOURS
Status: COMPLETED | OUTPATIENT
Start: 2023-06-27 | End: 2023-06-28

## 2023-06-27 RX ORDER — BISACODYL 10 MG
10 SUPPOSITORY, RECTAL RECTAL DAILY PRN
Status: DISCONTINUED | OUTPATIENT
Start: 2023-06-27 | End: 2023-06-28 | Stop reason: HOSPADM

## 2023-06-27 RX ORDER — OXYCODONE HYDROCHLORIDE 5 MG/1
10 TABLET ORAL EVERY 4 HOURS PRN
Status: DISCONTINUED | OUTPATIENT
Start: 2023-06-27 | End: 2023-06-28 | Stop reason: HOSPADM

## 2023-06-27 RX ORDER — ACETAMINOPHEN 325 MG/1
975 TABLET ORAL ONCE
Status: COMPLETED | OUTPATIENT
Start: 2023-06-27 | End: 2023-06-27

## 2023-06-27 RX ORDER — ACETAMINOPHEN 325 MG/1
975 TABLET ORAL EVERY 8 HOURS
Status: DISCONTINUED | OUTPATIENT
Start: 2023-06-27 | End: 2023-06-28 | Stop reason: HOSPADM

## 2023-06-27 RX ORDER — OXYCODONE HYDROCHLORIDE 5 MG/1
5-10 TABLET ORAL EVERY 4 HOURS PRN
Qty: 33 TABLET | Refills: 0 | Status: SHIPPED | OUTPATIENT
Start: 2023-06-27

## 2023-06-27 RX ORDER — NALOXONE HYDROCHLORIDE 0.4 MG/ML
0.2 INJECTION, SOLUTION INTRAMUSCULAR; INTRAVENOUS; SUBCUTANEOUS
Status: DISCONTINUED | OUTPATIENT
Start: 2023-06-27 | End: 2023-06-28 | Stop reason: HOSPADM

## 2023-06-27 RX ORDER — ROPIVACAINE HYDROCHLORIDE 5 MG/ML
INJECTION, SOLUTION EPIDURAL; INFILTRATION; PERINEURAL
Status: DISCONTINUED | OUTPATIENT
Start: 2023-06-27 | End: 2023-06-27

## 2023-06-27 RX ORDER — KETOROLAC TROMETHAMINE 15 MG/ML
15 INJECTION, SOLUTION INTRAMUSCULAR; INTRAVENOUS EVERY 6 HOURS
Status: COMPLETED | OUTPATIENT
Start: 2023-06-27 | End: 2023-06-28

## 2023-06-27 RX ORDER — ACETAMINOPHEN 325 MG/1
650 TABLET ORAL EVERY 4 HOURS PRN
Qty: 100 TABLET | Refills: 0
Start: 2023-06-27

## 2023-06-27 RX ORDER — HYDROXYZINE HYDROCHLORIDE 25 MG/1
25 TABLET, FILM COATED ORAL EVERY 6 HOURS PRN
Status: DISCONTINUED | OUTPATIENT
Start: 2023-06-27 | End: 2023-06-28 | Stop reason: HOSPADM

## 2023-06-27 RX ORDER — BUPIVACAINE HYDROCHLORIDE 7.5 MG/ML
INJECTION, SOLUTION INTRASPINAL
Status: COMPLETED | OUTPATIENT
Start: 2023-06-27 | End: 2023-06-27

## 2023-06-27 RX ORDER — KETAMINE HYDROCHLORIDE 10 MG/ML
INJECTION INTRAMUSCULAR; INTRAVENOUS PRN
Status: DISCONTINUED | OUTPATIENT
Start: 2023-06-27 | End: 2023-06-27

## 2023-06-27 RX ORDER — METOPROLOL TARTRATE 25 MG/1
50 TABLET, FILM COATED ORAL AT BEDTIME
Status: DISCONTINUED | OUTPATIENT
Start: 2023-06-27 | End: 2023-06-28 | Stop reason: HOSPADM

## 2023-06-27 RX ORDER — CEFAZOLIN SODIUM/WATER 2 G/20 ML
2 SYRINGE (ML) INTRAVENOUS
Status: COMPLETED | OUTPATIENT
Start: 2023-06-27 | End: 2023-06-27

## 2023-06-27 RX ORDER — ONDANSETRON 4 MG/1
4 TABLET, ORALLY DISINTEGRATING ORAL EVERY 30 MIN PRN
Status: DISCONTINUED | OUTPATIENT
Start: 2023-06-27 | End: 2023-06-27 | Stop reason: HOSPADM

## 2023-06-27 RX ORDER — SODIUM CHLORIDE, SODIUM LACTATE, POTASSIUM CHLORIDE, CALCIUM CHLORIDE 600; 310; 30; 20 MG/100ML; MG/100ML; MG/100ML; MG/100ML
INJECTION, SOLUTION INTRAVENOUS CONTINUOUS
Status: DISCONTINUED | OUTPATIENT
Start: 2023-06-27 | End: 2023-06-28

## 2023-06-27 RX ORDER — PRAVASTATIN SODIUM 20 MG
80 TABLET ORAL EVERY EVENING
Status: DISCONTINUED | OUTPATIENT
Start: 2023-06-27 | End: 2023-06-28 | Stop reason: HOSPADM

## 2023-06-27 RX ORDER — FENTANYL CITRATE 50 UG/ML
INJECTION, SOLUTION INTRAMUSCULAR; INTRAVENOUS PRN
Status: DISCONTINUED | OUTPATIENT
Start: 2023-06-27 | End: 2023-06-27

## 2023-06-27 RX ORDER — LIDOCAINE 40 MG/G
CREAM TOPICAL
Status: DISCONTINUED | OUTPATIENT
Start: 2023-06-27 | End: 2023-06-28 | Stop reason: HOSPADM

## 2023-06-27 RX ORDER — ONDANSETRON 4 MG/1
4 TABLET, ORALLY DISINTEGRATING ORAL EVERY 6 HOURS PRN
Status: DISCONTINUED | OUTPATIENT
Start: 2023-06-27 | End: 2023-06-28 | Stop reason: HOSPADM

## 2023-06-27 RX ORDER — AMOXICILLIN 250 MG
1 CAPSULE ORAL 2 TIMES DAILY
Status: DISCONTINUED | OUTPATIENT
Start: 2023-06-27 | End: 2023-06-28 | Stop reason: HOSPADM

## 2023-06-27 RX ADMIN — Medication 10 MG: at 16:08

## 2023-06-27 RX ADMIN — FENTANYL CITRATE 25 MCG: 50 INJECTION, SOLUTION INTRAMUSCULAR; INTRAVENOUS at 17:40

## 2023-06-27 RX ADMIN — ACETAMINOPHEN 975 MG: 325 TABLET, FILM COATED ORAL at 13:22

## 2023-06-27 RX ADMIN — SODIUM CHLORIDE, POTASSIUM CHLORIDE, SODIUM LACTATE AND CALCIUM CHLORIDE: 600; 310; 30; 20 INJECTION, SOLUTION INTRAVENOUS at 15:38

## 2023-06-27 RX ADMIN — HYDROMORPHONE HYDROCHLORIDE 0.2 MG: 0.2 INJECTION, SOLUTION INTRAMUSCULAR; INTRAVENOUS; SUBCUTANEOUS at 18:01

## 2023-06-27 RX ADMIN — BUPIVACAINE HYDROCHLORIDE IN DEXTROSE 1.6 ML: 7.5 INJECTION, SOLUTION SUBARACHNOID at 15:40

## 2023-06-27 RX ADMIN — ACETAMINOPHEN 975 MG: 325 TABLET, FILM COATED ORAL at 20:55

## 2023-06-27 RX ADMIN — DEXAMETHASONE SODIUM PHOSPHATE 4 MG: 10 INJECTION, SOLUTION INTRAMUSCULAR; INTRAVENOUS at 17:21

## 2023-06-27 RX ADMIN — KETAMINE HYDROCHLORIDE 50 MG: 10 INJECTION, SOLUTION INTRAMUSCULAR; INTRAVENOUS at 15:38

## 2023-06-27 RX ADMIN — Medication 2 G: at 15:25

## 2023-06-27 RX ADMIN — OXYCODONE HYDROCHLORIDE 10 MG: 5 TABLET ORAL at 19:06

## 2023-06-27 RX ADMIN — CEFAZOLIN 1 G: 1 INJECTION, POWDER, FOR SOLUTION INTRAMUSCULAR; INTRAVENOUS at 23:51

## 2023-06-27 RX ADMIN — SODIUM CHLORIDE, POTASSIUM CHLORIDE, SODIUM LACTATE AND CALCIUM CHLORIDE: 600; 310; 30; 20 INJECTION, SOLUTION INTRAVENOUS at 19:05

## 2023-06-27 RX ADMIN — KETOROLAC TROMETHAMINE 15 MG: 15 INJECTION, SOLUTION INTRAMUSCULAR; INTRAVENOUS at 19:05

## 2023-06-27 RX ADMIN — ONDANSETRON 4 MG: 2 INJECTION INTRAMUSCULAR; INTRAVENOUS at 16:54

## 2023-06-27 RX ADMIN — SENNOSIDES AND DOCUSATE SODIUM 1 TABLET: 50; 8.6 TABLET ORAL at 20:55

## 2023-06-27 RX ADMIN — PROPOFOL 75 MCG/KG/MIN: 10 INJECTION, EMULSION INTRAVENOUS at 15:38

## 2023-06-27 RX ADMIN — Medication 5 MG: at 16:27

## 2023-06-27 RX ADMIN — METOPROLOL TARTRATE 50 MG: 25 TABLET, FILM COATED ORAL at 20:56

## 2023-06-27 RX ADMIN — ROPIVACAINE HYDROCHLORIDE 20 ML: 5 INJECTION, SOLUTION EPIDURAL; INFILTRATION; PERINEURAL at 17:21

## 2023-06-27 RX ADMIN — HYDROMORPHONE HYDROCHLORIDE 0.4 MG: 0.2 INJECTION, SOLUTION INTRAMUSCULAR; INTRAVENOUS; SUBCUTANEOUS at 20:57

## 2023-06-27 RX ADMIN — TRANEXAMIC ACID 1 G: 1 INJECTION, SOLUTION INTRAVENOUS at 15:47

## 2023-06-27 RX ADMIN — MIDAZOLAM 2 MG: 1 INJECTION INTRAMUSCULAR; INTRAVENOUS at 15:27

## 2023-06-27 RX ADMIN — PHENYLEPHRINE HYDROCHLORIDE 100 MCG: 10 INJECTION INTRAVENOUS at 16:51

## 2023-06-27 RX ADMIN — FENTANYL CITRATE 25 MCG: 50 INJECTION, SOLUTION INTRAMUSCULAR; INTRAVENOUS at 17:47

## 2023-06-27 RX ADMIN — PRAVASTATIN SODIUM 80 MG: 20 TABLET ORAL at 20:55

## 2023-06-27 RX ADMIN — HYDROXYZINE HYDROCHLORIDE 25 MG: 25 TABLET, FILM COATED ORAL at 19:07

## 2023-06-27 RX ADMIN — Medication 10 MG: at 16:02

## 2023-06-27 RX ADMIN — FENTANYL CITRATE 100 MCG: 50 INJECTION, SOLUTION INTRAMUSCULAR; INTRAVENOUS at 15:27

## 2023-06-27 ASSESSMENT — ACTIVITIES OF DAILY LIVING (ADL)
ADLS_ACUITY_SCORE: 18
ADLS_ACUITY_SCORE: 18
ADLS_ACUITY_SCORE: 20
ADLS_ACUITY_SCORE: 18
ADLS_ACUITY_SCORE: 17
ADLS_ACUITY_SCORE: 20

## 2023-06-27 NOTE — PROVIDER NOTIFICATION
06/27/23 1333   Discharge Planning   Patient/Family Anticipates Transition to home   Concerns to be Addressed all concerns addressed in this encounter   Living Arrangements   People in Home spouse;child(jozef), adult   Type of Residence Private Residence   Is your private residence a single family home or apartment? Single family home   Number of Stairs, Within Home, Primary five   Stair Railings, Within Home, Primary railings safe and in good condition;railings on both sides of stairs   Once home, are you able to live on one level? Yes   Which level? Main Level   Which rooms are not on the main level? Kitchen;Bathroom;Bedroom   Bathroom Shower/Tub Tub/Shower unit   Equipment Currently Used at Home walker, standard;shower chair;raised toilet seat   Support System   Support Systems Spouse/Significant Other;Children   Do you have someone available to stay with you one or two nights once you are home? Yes   Medical Clearance   Date of Physical 06/19/23   It is recommended that you call and check with any specialty providers before surgery to see if you need surgical clearance.  Do you see any specialty providers outside of your primary care provider? No   Blood   Known Bleeding Disorder or Coagulopathy No   Does the patient have any Christian/cultural preferences related to blood products? No   Education   Has the patient scheduled or completed pre-op total joint education, either in class or online, in the last 12 months? No  (had right total knee replacement 1 year ago)

## 2023-06-27 NOTE — INTERVAL H&P NOTE
The History and Physical has been reviewed, the patient has been examined and no changes have occurred in the patient's condition since the H & P was completed.        Clinical Conditions Present on Arrival:  Clinically Significant Risk Factors Present on Admission                 # Drug Induced Platelet Defect: home medication list includes an antiplatelet medication

## 2023-06-27 NOTE — PROCEDURES
PREOPERATIVE DIAGNOSES: Left knee arthritis  POSTOPERATIVE DIAGNOSIS: Left knee arthritis  PROCEDURE: Left total knee arthroplasty.   SURGEON: Severo Edmonds MD   ASSISTANT: Ana Nguyễn PA-C The presence of the PA was necessary for safe progression of the case.   ANESTHESIA: Spinal with MAC.   ESTIMATED BLOOD LOSS: 50 mL.   TOURNIQUET TIME: 40 minutes at 250 mmHg.   COMPLICATIONS: None apparent.   DISPOSITION: Stable to PACU.     IMPLANTS USED: [employer] Oxinium Legion size 5N CR femoral component with all polyethylene size 3 tibial component, size 3 by 13mm CR polyethylene and 29 mm patella.      INDICATIONS: Katherine is a 67-year-old female with a long history of progressive left  knee pain due to end-stage medial compartment arthritis. Unfortunately, she failed conservative management including therapy and injections. After discussing risks, benefits and surgery, she elected to proceed with a left total knee replacement understanding the risks of infection, damage to vessels and nerves, blood clots, stiffness, ongoing pain, need for revision surgery, need for transfusion.  She has a severe metal allergy and did not want to risk having any nickel in her body.  Therefore, we elected proceed with an Oxinium femoral component all polyethylene tibial component     PROCEDURE: Katherine was brought to the preoperative holding area where the left knee was marked. Consent was reviewed. She then was transferred to the operating theater. After induction of successful spinal anesthesia, she was placed supine with a bump under the lefthip.  A timeout was performed, verifying correct patient, surgery, location. She received preoperative antibiotics as well as transexemic acid. The left lower extremity was then prepped and draped in standard sterile fashion.      We exsanguinated the leg and inflated the tourniquet. We then made a longitudinal incision over the anterior aspect of the knee. Dissection was carried down  through skin and subcutaneous tissue. A medial parapatellar arthrotomy was made, exposing end stage medial and patellofemoral arthritis.  Synovial tissue from the suprapatellar pouch excised. The anterior horn of the medial meniscus was released and a medial release was performed. Then, the remainder of the fat pad was excised. We turned our attention to the patella. Using a free hand technique, this was resected down to 12 mm and sized to a 29mm, then punched and a metal protector plate was placed. We then turned our attention to the femur. Preoperatively, there was a 7 degree flexion contracture.  Therefore, using an intramedullary alignment guide, 11 mm of distal femur was resected in 5 degrees of valgus.      We then turned our attention to the tibia.  An extramedullary alignment cut was used to resect 2mm off the low medial side, perpendicular to the mechanical axis.  We then turned our attention back to the distal femur and sized it to a size 5.  The epicondylar axis was established and we placed our 4-in-1 cutting block perpendicular to it, in 2 degrees of external rotation. With this in place, our anterior, posterior and chamfer distal femur cuts were made.  We then used a laminar  to open the joint in order to remove medial and lateral meniscus remnants as well as posterior osteophytes.  The PCL was intact.  A variety of polyethylene were trialed, and we felt a 13mm was best, with range of motion from full extension to 135 of flexion, stability to varus and valgus stress, normal POLO test, and the patella tracked well.  After this, sized our tibial component to a 3.  We then drilled and punched our tibial component, lining it with the medial 1/3 of the tibial tubercle. The knee was thoroughly irrigated using pulse lavage. The final components were then cemented in place. All excess cement was removed and the knee was placed into full extension while the cement was curing. Also, the wound was  instilled with dilute Betadine solution.  We then placed the final poly.  The tourniquet was deflated with hemostasis achieved.  The capsular layer was closed with #1 Stratafix, at which point there was 130 degrees of flexion with gravity.  Subcutaneous tissues with 2-0 Vicryl, skin with a 3-0 Monocryl. A sterile dressing was applied and the patient was awoken from anesthesia and transferred to PACU in stable condition.      POSTOPERATIVE PLAN:   1. Weightbearing as tolerated left lower extremity with PT for mobilization.   2. Deep venous thrombosis prophylaxis: Aspirin 325mg daily x 30 days  3. Perioperative antibiotics.   4. Follow up in 2 weeks for wound check.      KEVIN WAGNER MD

## 2023-06-27 NOTE — PROGRESS NOTES
[place] [facility] [place]    Patient admitted to room 2303 at approximately 1840 via cart from surgery. Patient was accompanied by transport tech.     Verbal SBAR report received from Julee JOHNSON prior to patient arrival.     Patient trasferred to bed via air cj. Patient alert and oriented X 3. Pain is controlled with current analgesics.  Medication(s) being used: narcotic analgesics including Fentanyl, and diluadid.  . Admission vital signs: Blood pressure (!) 144/92, pulse 72, temperature 98.5  F (36.9  C), temperature source Oral, resp. rate 25, height 1.6 m (5' 2.99\"), weight 69.9 kg (154 lb), last menstrual period 05/08/2005, SpO2 95 %. Patient was oriented to plan of care, call light, bed controls, tv, telephone, bathroom and visiting hours.     Risk Assessment    The following safety risks were identified during admission: fall and skin. Yellow risk band applied: YES.     Skin Initial Assessment    This writer admitted this patient and completed a full skin assessment and Benjamin score in the Adult PCS flowsheet. Appropriate interventions initiated as needed.     Secondary skin check completed by Celeste JOHNSON.         Education    Patient has a [place] to Observation order: Yes  Observation education completed and documented: Yes      Hedy Fish RN    "

## 2023-06-27 NOTE — ANESTHESIA PROCEDURE NOTES
"Intrathecal injection Procedure Note    Pre-Procedure   Staff -        Performed By: CRNA       Pre-Anesthestic Checklist: patient identified, IV checked, risks and benefits discussed, informed consent, monitors and equipment checked, pre-op evaluation, at physician/surgeon's request and post-op pain management  Timeout:       Correct Patient: Yes        Correct Procedure: Yes        Correct Site: Yes        Correct Position: Yes   Procedure Documentation  Procedure: intrathecal injection       Diagnosis: TKA       Patient Position: sitting       Patient Prep/Sterile Barriers: sterile gloves, mask, patient draped       Skin prep: Betadine       Insertion Site: L2-3. (midline approach).       Needle Gauge: 22.        Needle Length (Inches): 3.5        Spinal Needle Type: Quincke       Introducer used       Introducer: 20 G       # of attempts: 1 and  # of redirects:  2    Assessment/Narrative         Paresthesias: No.       Sensory Level: T10       CSF fluid: clear.    Medication(s) Administered   0.75% Hyperbaric Bupivacaine (Intrathecal) - Intrathecal   1.6 mL - 6/27/2023 3:40:00 PM    FOR [facility] ([facility]) ONLY:   Pain Team Contact information: please page the Pain Team Via EMUZE. Search \"Pain\". During daytime hours, please page the attending first. At night please page the resident first.      "

## 2023-06-27 NOTE — MEDICATION SCRIBE - ADMISSION MEDICATION HISTORY
Medication Scribe Admission Medication History    Admission medication history is complete. The information provided in this note is only as accurate as the sources available at the time of the update.    Medication reconciliation/reorder completed by provider prior to medication history? No    Information Source(s): Patient and CareEverywhere/SureScripts via in room with patient and finished at desk.    Pertinent Information: Patient has Aspirin 325 mg strength and Senokot-S at home leftover from last procedure.    Changes made to PTA medication list:    Added: None    Deleted: Aspirin 325 mg tab.    Changed: B12 to daily and D to 1 capsule daily.    Medication Affordability:  Not including over the counter (OTC) medications, was there a time in the past 3 months when you did not take your medications as prescribed because of cost?: No    Allergies reviewed with patient and updates made in EHR: yes, no change.    Medication History Completed By: Leonor Butt 6/27/2023 2:13 PM    Prior to Admission medications    Medication Sig Last Dose Taking? Auth Provider Long Term End Date   acetaminophen (TYLENOL) 325 MG tablet Take 2 tablets (650 mg) by mouth every 4 hours as needed for other (mild pain) Past Week at unknown Yes Severo Edmonds MD     amLODIPine (NORVASC) 5 MG tablet Take 1 tablet (5 mg) by mouth daily 6/27/2023 at 1000 Yes Luci Silver APRN CNP Yes    Cholecalciferol 100 MCG (4000 UT) CAPS Take 1 capsule by mouth daily Past Week at am Yes Reported, Patient     hydrOXYzine (VISTARIL) 25 MG capsule Take 1 capsule (25 mg) by mouth 3 times daily as needed for itching 6/26/2023 at hs Yes Severo Edmonds MD     metoprolol tartrate (LOPRESSOR) 50 MG tablet TAKE ONE TABLET BY MOUTH EVERY NIGHT AT BEDTIME 6/26/2023 at hs Yes Luci Silver APRN CNP Yes    oxyCODONE (ROXICODONE) 5 MG tablet Take 1-2 tablets (5-10 mg) by mouth every 4 hours as needed for moderate to severe pain 6/26/2023 at  hs 1 tab Yes Severo Edmonds MD     pravastatin (PRAVACHOL) 80 MG tablet Take one by mouth daily.  Patient taking differently: Take 80 mg by mouth every evening Take one by mouth daily. 6/26/2023 at hs Yes Luci Silver APRN CNP Yes    senna-docusate (SENOKOT-S/PERICOLACE) 8.6-50 MG tablet Take 1-2 tablets by mouth daily as needed for constipation Take while on oral narcotics to prevent or treat constipation. More than a month at on hand, not using Yes Severo Edmonds MD     traZODone (DESYREL) 50 MG tablet Take 1-2 tablets by mouth nightly as needed for insomnia Past Week at hs Yes Reported, Patient Yes    vitamin B-12 (CYANOCOBALAMIN) 1000 MCG tablet Take 1,000 mcg by mouth daily Past Week at am Yes Reported, Patient

## 2023-06-27 NOTE — ANESTHESIA CARE TRANSFER NOTE
Patient: Katherine Love    Procedure: Procedure(s):  left total Knee Arthroplasty       Diagnosis: Arthritis of left knee [M17.12]  Diagnosis Additional Information: No value filed.    Anesthesia Type:   Spinal     Note:    Oropharynx: spontaneously breathing  Level of Consciousness: drowsy  Oxygen Supplementation: room air    Independent Airway: airway patency satisfactory and stable  Dentition: dentition unchanged  Vital Signs Stable: post-procedure vital signs reviewed and stable  Report to RN Given: handoff report given  Patient transferred to: Phase II    Handoff Report: Identifed the Patient, Identified the Reponsible Provider, Reviewed the pertinent medical history, Discussed the surgical course, Reviewed Intra-OP anesthesia mangement and issues during anesthesia, Set expectations for post-procedure period and Allowed opportunity for questions and acknowledgement of understanding      Vitals:  Vitals Value Taken Time   BP     Temp     Pulse 90 06/27/23 1709   Resp 17 06/27/23 1709   SpO2 94 % 06/27/23 1709   Vitals shown include unvalidated device data.    Electronically Signed By: HARRIET Apodaca CRNA  June 27, 2023  5:10 PM

## 2023-06-27 NOTE — ANESTHESIA PROCEDURE NOTES
Femoral (adductor canal) Procedure Note    Pre-Procedure   Staff -        CRNA: Lucy Boss APRN CRNA       Performed By: CRNA       Pre-Anesthestic Checklist: patient identified, IV checked, site marked, risks and benefits discussed, informed consent, monitors and equipment checked, pre-op evaluation, at physician/surgeon's request and post-op pain management  Timeout:       Correct Patient: Yes        Correct Procedure: Yes        Correct Site: Yes        Correct Position: Yes        Correct Laterality: Yes        Site Marked: Yes  Procedure Documentation  Procedure: Femoral (adductor canal)       Diagnosis: TKA       Laterality: left       Patient Position: supine       Patient Prep/Sterile Barriers: sterile gloves, mask       Skin prep: Chloraprep       Local skin infiltrated with 2 mL of 1% lidocaine.        Needle Type: insulated       Needle Gauge: 22.        Needle Length (millimeters): 100        Ultrasound guided       1. Ultrasound was used to identify targeted nerve, plexus, vascular marker, or fascial plane and place a needle adjacent to it in real-time.       2. Ultrasound was used to visualize the spread of anesthetic in close proximity to the above referenced structure.       3. A permanent image is entered into the patient's record.       4. The visualized anatomic structures appeared normal.       5. There were no apparent abnormal pathologic findings.    Assessment/Narrative         The placement was negative for: blood aspirated, painful injection and site bleeding       Paresthesias: No.       Test dose of 5 mL lidocaine 2% w/ 1:200,000 epinephrine at 17:20 CDT.        .       Bolus given via needle. no blood aspirated via catheter.        Secured via.        Insertion/Infusion Method: Single Shot       Complications: none       Injection made incrementally with aspirations every 5 mL.    Medication(s) Administered   Ropivacaine 0.5% PF (Infiltration) - Infiltration   20 mL - 6/27/2023  "5:21:00 PM  Dexamethasone 10 mg/mL PF (Perineural) - Perineural   4 mg - 6/27/2023 5:21:00 PM    FOR [facility] ([facility]) ONLY:   Pain Team Contact information: please page the Pain Team Via MeetCast. Search \"Pain\". During daytime hours, please page the attending first. At night please page the resident first.      "

## 2023-06-27 NOTE — ANESTHESIA POSTPROCEDURE EVALUATION
Patient: Katherine Love    Procedure: Procedure(s):  left total Knee Arthroplasty       Anesthesia Type:  Spinal    Note:  Disposition: Outpatient   Postop Pain Control: Uneventful            Sign Out: Well controlled pain   PONV: No   Neuro/Psych: Uneventful            Sign Out: Acceptable/Baseline neuro status   Airway/Respiratory: Uneventful            Sign Out: Acceptable/Baseline resp. status   CV/Hemodynamics: Uneventful            Sign Out: Acceptable CV status; No obvious hypovolemia; No obvious fluid overload   Other NRE: NONE   DID A NON-ROUTINE EVENT OCCUR? No           Last vitals:  Vitals Value Taken Time   /92 06/27/23 1815   Temp 36.9  C (98.5  F) 06/27/23 1809   Pulse 69 06/27/23 1815   Resp 25 06/27/23 1815   SpO2 95 % 06/27/23 1827   Vitals shown include unvalidated device data.    Electronically Signed By: HARRIET Apodaca CRNA  June 27, 2023  6:42 PM

## 2023-06-28 ENCOUNTER — APPOINTMENT (OUTPATIENT)
Dept: PHYSICAL THERAPY | Facility: CLINIC | Age: 68
End: 2023-06-28
Attending: ORTHOPAEDIC SURGERY
Payer: COMMERCIAL

## 2023-06-28 VITALS
BODY MASS INDEX: 27.29 KG/M2 | WEIGHT: 154 LBS | DIASTOLIC BLOOD PRESSURE: 62 MMHG | TEMPERATURE: 98.2 F | SYSTOLIC BLOOD PRESSURE: 120 MMHG | HEIGHT: 63 IN | HEART RATE: 61 BPM | OXYGEN SATURATION: 93 % | RESPIRATION RATE: 16 BRPM

## 2023-06-28 LAB
FASTING STATUS PATIENT QL REPORTED: ABNORMAL
GLUCOSE SERPL-MCNC: 143 MG/DL (ref 70–99)
HGB BLD-MCNC: 13.5 G/DL (ref 11.7–15.7)

## 2023-06-28 PROCEDURE — 97161 PT EVAL LOW COMPLEX 20 MIN: CPT | Mod: GP | Performed by: PHYSICAL MEDICINE & REHABILITATION

## 2023-06-28 PROCEDURE — 85018 HEMOGLOBIN: CPT | Performed by: ORTHOPAEDIC SURGERY

## 2023-06-28 PROCEDURE — 250N000013 HC RX MED GY IP 250 OP 250 PS 637: Performed by: ORTHOPAEDIC SURGERY

## 2023-06-28 PROCEDURE — 99203 OFFICE O/P NEW LOW 30 MIN: CPT

## 2023-06-28 PROCEDURE — 97110 THERAPEUTIC EXERCISES: CPT | Mod: GP | Performed by: PHYSICAL MEDICINE & REHABILITATION

## 2023-06-28 PROCEDURE — 36415 COLL VENOUS BLD VENIPUNCTURE: CPT | Performed by: ORTHOPAEDIC SURGERY

## 2023-06-28 PROCEDURE — 97116 GAIT TRAINING THERAPY: CPT | Mod: GP | Performed by: PHYSICAL MEDICINE & REHABILITATION

## 2023-06-28 PROCEDURE — 250N000013 HC RX MED GY IP 250 OP 250 PS 637

## 2023-06-28 PROCEDURE — 82947 ASSAY GLUCOSE BLOOD QUANT: CPT | Performed by: ORTHOPAEDIC SURGERY

## 2023-06-28 PROCEDURE — 250N000011 HC RX IP 250 OP 636: Performed by: ORTHOPAEDIC SURGERY

## 2023-06-28 RX ADMIN — KETOROLAC TROMETHAMINE 15 MG: 15 INJECTION, SOLUTION INTRAMUSCULAR; INTRAVENOUS at 00:04

## 2023-06-28 RX ADMIN — SENNOSIDES AND DOCUSATE SODIUM 1 TABLET: 50; 8.6 TABLET ORAL at 08:21

## 2023-06-28 RX ADMIN — AMLODIPINE BESYLATE 5 MG: 5 TABLET ORAL at 08:21

## 2023-06-28 RX ADMIN — CEFAZOLIN 1 G: 1 INJECTION, POWDER, FOR SOLUTION INTRAMUSCULAR; INTRAVENOUS at 07:05

## 2023-06-28 RX ADMIN — OXYCODONE HYDROCHLORIDE 10 MG: 5 TABLET ORAL at 05:45

## 2023-06-28 RX ADMIN — ACETAMINOPHEN 975 MG: 325 TABLET, FILM COATED ORAL at 13:13

## 2023-06-28 RX ADMIN — ACETAMINOPHEN 975 MG: 325 TABLET, FILM COATED ORAL at 05:44

## 2023-06-28 RX ADMIN — OXYCODONE HYDROCHLORIDE 10 MG: 5 TABLET ORAL at 10:28

## 2023-06-28 RX ADMIN — OXYCODONE HYDROCHLORIDE 10 MG: 5 TABLET ORAL at 00:00

## 2023-06-28 RX ADMIN — KETOROLAC TROMETHAMINE 15 MG: 15 INJECTION, SOLUTION INTRAMUSCULAR; INTRAVENOUS at 07:05

## 2023-06-28 RX ADMIN — KETOROLAC TROMETHAMINE 15 MG: 15 INJECTION, SOLUTION INTRAMUSCULAR; INTRAVENOUS at 13:13

## 2023-06-28 RX ADMIN — ASPIRIN 325 MG: 325 TABLET, COATED ORAL at 08:21

## 2023-06-28 ASSESSMENT — ACTIVITIES OF DAILY LIVING (ADL)
ADLS_ACUITY_SCORE: 18
DEPENDENT_IADLS:: INDEPENDENT

## 2023-06-28 NOTE — PROGRESS NOTES
06/28/23 0900   Appointment Info   Signing Clinician's Name / Credentials (PT) Scar Carpenter, PT   Quick Adds   Quick Adds Certification   Living Environment   People in Home spouse;child(jozef), adult   Current Living Arrangements house   Home Accessibility stairs to enter home   Number of Stairs, Main Entrance 5   Stair Railings, Main Entrance railings safe and in good condition;railings on both sides of stairs   Transportation Anticipated family or friend will provide   Living Environment Comments lives with spouse and adult son, will be staying on floor for a while   Self-Care   Equipment Currently Used at Home walker, standard;shower chair;raised toilet seat   Fall history within last six months no   General Information   Onset of Illness/Injury or Date of Surgery 06/27/23   Referring Physician Dr. Severo Edmonds   Patient/Family Therapy Goals Statement (PT) to return home safely   Pertinent History of Current Problem (include personal factors and/or comorbidities that impact the POC) s/p L TKA   Existing Precautions/Restrictions no known precautions/restrictions   Weight-Bearing Status - LLE weight-bearing as tolerated   Cognition   Affect/Mental Status (Cognition) WFL   Orientation Status (Cognition) oriented x 4   Follows Commands (Cognition) WFL   Pain Assessment   Patient Currently in Pain Yes, see Vital Sign flowsheet   Range of Motion (ROM)   Range of Motion ROM deficits secondary to surgical procedure;ROM deficits secondary to pain;ROM deficits secondary to weakness   ROM Comment able to dangle at 90* on EOB   Strength (Manual Muscle Testing)   Strength (Manual Muscle Testing) Deficits observed during functional mobility   Strength Comments able to complete L SLR   Bed Mobility   Bed Mobility no deficits identified   Comment, (Bed Mobility) uses BUE or contralateral LE   Transfers   Transfers no deficits identified   Gait/Stairs (Locomotion)   Yolo Level (Gait) supervision   Assistive Device  (Gait) walker, front-wheeled   Distance in Feet 150   Distance in Feet (Gait) 150   Pattern (Gait) step-through   Deviations/Abnormal Patterns (Gait) weight shifting decreased;gait speed decreased;ramesh decreased;antalgic   Negotiation (Stairs) stairs independence;handrail location;number of steps;ascending technique;descending technique   Rogers Level (Stairs) contact guard   Handrail Location (Stairs) both sides   Number of Steps (Stairs) 6   Ascending Technique (Stairs) step-to-step   Descending Technique (Stairs) step-to-step   Balance   Balance other (describe)   Standing Balance: Static fair balance   Balance Quick Add Standing balance: static   Clinical Impression   Criteria for Skilled Therapeutic Intervention Yes, treatment indicated   PT Diagnosis (PT) s/p L TKA   Influenced by the following impairments pain, weakness   Functional limitations due to impairments squatting, gait, stairs   Clinical Presentation (PT Evaluation Complexity) Stable/Uncomplicated   Clinical Presentation Rationale clinical judgement   Clinical Decision Making (Complexity) low complexity   Planned Therapy Interventions (PT) gait training;balance training;home program guidelines;progressive activity/exercise;stretching;strengthening;stair training;ROM (range of motion);neuromuscular re-education;home exercise program   Risk & Benefits of therapy have been explained evaluation/treatment results reviewed;care plan/treatment goals reviewed;risks/benefits reviewed;current/potential barriers reviewed;participants voiced agreement with care plan;participants included;patient   PT Total Evaluation Time   PT Eval, Low Complexity Minutes (74040) 10   Plan of Care Review   Plan of Care Reviewed With patient   Therapy Certification   Start of care date 06/28/23   Certification date from 06/28/23   Certification date to 07/05/23   Medical Diagnosis s/p L TKA   Physical Therapy Goals   PT Frequency One time eval and treatment only   PT  Predicted Duration/Target Date for Goal Attainment 07/05/23   PT Goals Transfers;Gait;Stairs   PT: Transfers Supervision/stand-by assist;Sit to/from stand;Bed to/from chair;Goal Met;Completed   PT: Gait Supervision/stand-by assist;Rolling walker;150 feet;Goal Met;Completed   PT: Stairs Supervision/stand-by assist;6 stairs;Rail on both sides;Goal Met;Completed   Interventions   Interventions Quick Adds Gait Training;Therapeutic Procedure   Therapeutic Procedure/Exercise   Ther. Procedure: strength, endurance, ROM, flexibillity Minutes (73561) 8   Symptoms Noted During/After Treatment fatigue;increased pain   Treatment Detail/Skilled Intervention Patient provided with and instructed in completion of a home program to improve ROM/strength. Exercises included: ankle pumps, SAQ/LAQ, quad/gluteal/HS sets, heel slides seated and supine, SLR, and sit to stands. Verbal/tactile cues given on appropriate form and pacing to maximize benefit.   Gait Training   Gait Training Minutes (22686) 15   Symptoms Noted During/After Treatment (Gait Training) fatigue;increased pain;shortness of breath   Treatment Detail/Skilled Intervention Patient ambulates hallways a total of 150 feet CGA using FWW. VC/TC on allowing WBAT and heel to toe WB pattern if able relying as little as possible on FWW. Stair training up/down 6 steps with education on ascending/descending pattern to keep pain under control and for appropriate stability.   PT Discharge Planning   PT Plan Discharge home today.   PT Discharge Recommendation (DC Rec) home with assist;home with home care physical therapy   PT Rationale for DC Rec Patient moving very well with ability to get in/out of bed, transfer, and ambulate without physical support. Expected to do well with HHPT with progression to OP PT.   PT Brief overview of current status SBA bed mobility, transfers; CGA gait x150 feet and 6 steps   Total Session Time   Timed Code Treatment Minutes 23   Total Session Time (sum  of timed and untimed services) 33     [facility]  OUTPATIENT PHYSICAL THERAPY EVALUATION  PLAN OF TREATMENT FOR OUTPATIENT REHABILITATION  (COMPLETE FOR INITIAL CLAIMS ONLY)  Patient's Last Name, First Name, M.I.  YOB: 1955  Katherine Love                        Provider's Name  [facility] Medical Record No.  [id number]                             Onset Date:  06/27/23   Start of Care Date:  06/28/23   Type:     _X_PT   ___OT   ___SLP Medical Diagnosis:  s/p L TKA              PT Diagnosis:  s/p L TKA Visits from SOC:  1     See note for plan of treatment, functional goals and certification details    I CERTIFY THE NEED FOR THESE SERVICES FURNISHED UNDER        THIS PLAN OF TREATMENT AND WHILE UNDER MY CARE     (Physician co-signature of this document indicates review and certification of the therapy plan).

## 2023-06-28 NOTE — DISCHARGE SUMMARY
[facility] Orthopedics Discharge Summary                                  [facility]     VEENA CARTWRIGHT [id number]   Age: 67 year old  PCP: Luci Silver, [phone] 1955     Date of Admission:  6/27/2023  Date of Discharge::  6/28/2023  Discharge Physician:  Doug Delgado PA-C    Code status:  Full Code    Admission Information:  Admission Diagnosis:  Arthritis of left knee [M17.12]  S/P knee replacement [Z96.659]    Post-Operative Day: 1 Day Post-Op     Reason for admission:  The patient was admitted for the following:Procedure(s) (LRB):  left total Knee Arthroplasty (Left)    Principal Problem:    S/P knee replacement      Allergies:  Patient has no known allergies.    Following the procedure noted above the patient was transferred to the post-op floor and started on:    Therapy:  physical therapy  Anticoagulation Plan:  mg daily  for 30 days  Pain Management: oxycodone, tylenol and vistaril  Weight bearing status: Weight bearing as tolerated     The patient was followed and co-managed by the hospitalist service during the inpatient treatment course  Complications:  None  Consultations:  None     Pertinent Labs   Lab Results: personally reviewed.     Recent Labs   Lab Test 06/28/23  0516 07/08/22  0529 02/13/19  1324 01/23/19  1044 01/15/19  1741 01/14/19  1849   HGB 13.5 12.7  --   --   --  16.9*   HCT  --   --   --   --   --  48.5*   MCV  --   --   --   --   --  87   PLT  --   --   --   --   --  287   NA  --   --  138 137 139 134          Discharge Information:  Condition at discharge: Stable  Discharge destination:  Admited to home care:   Agency: [facility]   Discharged to home     Medications at discharge:  Current Discharge Medication List      START taking these medications    Details   aspirin (ASA) 325 MG EC tablet Take 1 tablet (325 mg) by mouth daily  Qty: 30 tablet, Refills: 0    Associated Diagnoses: Status post total left knee replacement      hydrOXYzine  (ATARAX) 25 MG tablet Take 1 tablet (25 mg) by mouth every 6 hours as needed for itching or anxiety (with pain, moderate pain)  Qty: 33 tablet, Refills: 0    Associated Diagnoses: Status post total left knee replacement         CONTINUE these medications which have CHANGED    Details   acetaminophen (TYLENOL) 325 MG tablet Take 2 tablets (650 mg) by mouth every 4 hours as needed for other (mild pain)  Qty: 100 tablet, Refills: 0    Associated Diagnoses: Status post total left knee replacement      oxyCODONE (ROXICODONE) 5 MG tablet Take 1-2 tablets (5-10 mg) by mouth every 4 hours as needed for moderate to severe pain  Qty: 33 tablet, Refills: 0    Associated Diagnoses: Status post total left knee replacement      senna-docusate (SENOKOT-S/PERICOLACE) 8.6-50 MG tablet Take 1-2 tablets by mouth 2 times daily Take while on oral narcotics to prevent or treat constipation.  Qty: 30 tablet, Refills: 0    Comments: While taking narcotics  Associated Diagnoses: Status post total left knee replacement         CONTINUE these medications which have NOT CHANGED    Details   amLODIPine (NORVASC) 5 MG tablet Take 1 tablet (5 mg) by mouth daily  Qty: 90 tablet, Refills: 3    Associated Diagnoses: Essential hypertension, benign      Cholecalciferol 100 MCG (4000 UT) CAPS Take 1 capsule by mouth daily      hydrOXYzine (VISTARIL) 25 MG capsule Take 1 capsule (25 mg) by mouth 3 times daily as needed for itching  Qty: 30 capsule, Refills: 0    Associated Diagnoses: Status post total right knee replacement      metoprolol tartrate (LOPRESSOR) 50 MG tablet TAKE ONE TABLET BY MOUTH EVERY NIGHT AT BEDTIME  Qty: 90 tablet, Refills: 3    Associated Diagnoses: Essential hypertension, benign      pravastatin (PRAVACHOL) 80 MG tablet Take one by mouth daily.  Qty: 90 tablet, Refills: 3    Associated Diagnoses: Hyperlipidemia LDL goal <130      traZODone (DESYREL) 50 MG tablet Take 1-2 tablets by mouth nightly as needed for insomnia       vitamin B-12 (CYANOCOBALAMIN) 1000 MCG tablet Take 1,000 mcg by mouth daily                        Follow-Up Care:  Patient should be seen in the office in 14 days by the Orthopedic Surgeon/Physician Assistant.  Call [phone] for appointment or questions.    Doug Delgado PA-C

## 2023-06-28 NOTE — CONSULTS
Care Management Initial Consult    General Information  Assessment completed with: Patient,    Type of CM/SW Visit: Initial Assessment    Primary Care Provider verified and updated as needed:     Readmission within the last 30 days:        Reason for Consult: discharge planning  Advance Care Planning: Advance Care Planning Reviewed: no concerns identified        Communication Assessment  Patient's communication style: spoken language (English or Bilingual)    Hearing Difficulty or Deaf: no   Wear Glasses or Blind: no    Cognitive  Cognitive/Neuro/Behavioral: WDL                      Living Environment:   People in home: spouse, child(jozef), adult     Current living Arrangements: house      Able to return to prior arrangements: yes     Family/Social Support:  Care provided by: self, spouse/significant other, child(jozef)  Provides care for:    Marital Status:   , Children          Description of Support System: Supportive, Involved    Support Assessment: Adequate family and caregiver support, Adequate social supports    Current Resources:   Patient receiving home care services: No     Community Resources: None  Equipment currently used at home: walker, standard, shower chair, raised toilet seat  Supplies currently used at home:      Lifestyle & Psychosocial Needs:  Social Determinants of Health     Tobacco Use: High Risk (6/28/2023)    Patient History      Smoking Tobacco Use: Some Days      Smokeless Tobacco Use: Never      Passive Exposure: Not on file   Alcohol Use: Not on file   Financial Resource Strain: Not on file   Food Insecurity: Not on file   Transportation Needs: Not on file   Physical Activity: Not on file   Stress: Not on file   Social Connections: Not on file   Intimate Partner Violence: Not on file   Depression: Not at risk (1/14/2019)    PHQ-2      PHQ-2 Score: 0   Housing Stability: Not on file       Functional Status:  Prior to admission patient needed assistance:   Dependent ADLs::  Independent  Dependent IADLs:: Independent     Mental Health Status:  Mental Health Status: No Current Concerns       Chemical Dependency Status:  Chemical Dependency Status: No Current Concerns           Values/Beliefs:  Spiritual, Cultural Beliefs, Hindu Practices, Values that affect care: no      Additional Information:    Care Management team received referral from Ortho team to assist pt with discharge planning services post surgical services.     Patient has been preplanned with home care PT services through [facility]. Met with patient at bedside to discuss discharge plans. Patient is aware of home care services and is in agreement with discharge plan. Pt will arrange transport at discharge.     PLAN: - [facility] PT    BRENDON FUENTES RN

## 2023-06-28 NOTE — PROGRESS NOTES
"Time: 1900-0730    Reason for Admission: Left knee replacement      Activity:  Assist of one, gait belt and walker.  Patient ambulating to bathroom in room.      Neuro: Patient is alert and orientated x 4.  Has been calm and cooperative.      Resp:  LS are clear.  O2 sats 92% on RA.  No SOB.      GI/:  No c/o nausea.  Urinating without difficulty.      Skin: Left knee surgical incision dressing is clean,dry and intact.      Diet:Regular diet      IV Access:  PIV left hand is saline locked.      Vitals: /64 (BP Location: Left arm)   Pulse 69   Temp 99.1  F (37.3  C) (Oral)   Resp 16   Ht 1.6 m (5' 2.99\")   Wt 69.9 kg (154 lb)   LMP 05/08/2005   SpO2 92%   BMI 27.29 kg/m        Pain: Pain has been well controlled with PRN PO Oxycodone 10 mg every 4 hours and scheduled Tylenol and Toradol.  Had one dose of PRN IV dilaudid 0.4 mg.        Plan: PT.  Possible discharge today.    "

## 2023-06-28 NOTE — PROGRESS NOTES
[facility] Medicine Progress Note  Date of Service: 06/28/2023    Assessment & Plan   Katherine Love is a 67 year old female who presented on 6/27/2023 for scheduled Procedure(s):  left total Knee Arthroplasty by Severo Edmonds MD and is being followed by the hospital medicine service for co-management of acute and/or chronic perioperative medical problems.      S/p Procedure(s):  left total Knee Arthroplasty   1 Day Post-Op    - pain control, wound cares, physical therapy, occupational therapy and DVT prophylaxis per orthopedic surgery service  -Hemoglobin 13.5 with EBL of 50 mL.  Stable.    Hypertension  Blood pressure well controlled.  Normotensive POD#1.  Managed PTA with amlodipine 5 mg daily and metoprolol tartrate 50 mg at bedtime.  -Continue PTA amlodipine and metoprolol.    H/o paroxysmal SVT  Prior diagnosis.  -Continue PTA metoprolol    Hyperlipidemia   Managed PTA with pravastatin 80 mg every evening.  -Continue PTA statin    DVT Prophylaxis: as per orthopedic surgery service -  daily x 30 days  Code Status: Full Code    Lines: PIV  Martinez catheter: None    Discussion: Medically, the patient appears to be doing well and is optimized for discharge. No barriers at this time.    Disposition: Anticipate discharge 06/28/23     Attestation:  I have reviewed today's vital signs, notes, medications, labs and imaging.    I have discussed, or will be discussing, the patient with hospitalist physician, Dr. Granados.    ELI HDEZ PA-C     Interval History   POD#1: Routine post-op morning rounds. No overnight events.  Patient reports feeling well this morning, despite sleeping poorly.  She states ambulating well, feeling steady on her feet without any lightheadedness.  Appetite is intact and tolerating p.o. without any nausea or vomiting.  She has been passing flatus however no bowel movement at this point.  Urinating without difficulty and at baseline.  She  denies any chest pain, shortness of breath, cough, sore throat, abdominal pain, nausea or vomiting, dizziness, or paresthesias.    Physical Exam   Temp:  [98.1  F (36.7  C)-99.1  F (37.3  C)] 99.1  F (37.3  C)  Pulse:  [68-96] 69  Resp:  [10-25] 16  BP: (109-147)/(57-92) 113/64  SpO2:  [92 %-97 %] 92 %    Weights:   Vitals:    06/27/23 1308   Weight: 69.9 kg (154 lb)    Body mass index is 27.29 kg/m .    General: Alert, oriented x4, sitting upright in chair, pleasant, cooperative, no acute distress, nontoxic-appearing.  CV: Regular rate and rhythm.  Normal S1 and S2 heart sounds.  No S3, S4, murmurs, rubs, clicks, or gallops.  Radial pulse strong and regular bilaterally.  There is no lower extremity edema.  Respiratory: Lungs are clear to auscultation bilaterally.  No wheezes, rhonchi, crackles.  Normal respiratory effort on room air. Speaking in full sentences.  GI: Soft, flat, nontender to palpation throughout.  Bowel sounds are normal active.  No appreciation of HSP or abdominal masses.  Skin: Warm and dry  Musculoskeletal: Surgical site exam deferred to orthopedic/surgery provider.  Left leg wrapped in Ace bandage with ice pack applied.  Dorsiflexion and plantarflexion intact bilaterally.  Sensation is intact and symmetrical bilaterally.  Pedal pulses present bilaterally.  No calf pain bilaterally.    Data   Recent Labs   Lab 06/28/23  0516   HGB 13.5   *       Recent Labs   Lab 06/28/23  0516   *      Unresulted Labs Ordered in the Past 30 Days of this Admission     No orders found for last 31 day(s).         Imaging  Recent Results (from the past 24 hour(s))   XR Knee Port Left 1/2 Views    Narrative    EXAM: XR KNEE PORT LEFT 1/2 VIEWS  LOCATION: [facility]  DATE: 6/27/2023    INDICATION: Left knee postoperative follow-up.  COMPARISON: None.      Impression    IMPRESSION: Left total knee arthroplasty. The components are well seated. No fracture or joint malalignment.  Postoperative intra-articular and soft tissue gas.      I reviewed all new labs and imaging results over the last 24 hours. I personally reviewed no images or EKG's today.    Medications     lactated ringers 75 mL/hr at 06/27/23 1905       acetaminophen  975 mg Oral Q8H     amLODIPine  5 mg Oral Daily     aspirin  325 mg Oral Daily     ceFAZolin  1 g Intravenous Q8H     ketorolac  15 mg Intravenous Q6H     metoprolol tartrate  50 mg Oral At Bedtime     polyethylene glycol  17 g Oral Daily     pravastatin  80 mg Oral QPM     senna-docusate  1 tablet Oral BID     sodium chloride (PF)  3 mL Intracatheter Q8H     ELI HDEZ PA-C

## 2023-06-28 NOTE — PROGRESS NOTES
"[facility] Orthopaedics Progress Note      Post-operative Day: 1 Day Post-Op    Procedure(s):  left total Knee Arthroplasty  Subjective:    Pt reports mild pain in the left knee that has been controlled. She hasn't yet seen PT today but hopes to go home later. She already has home PT set up to start tomorrow.     Chest pain, SOB:  No  Nausea, vomiting:  No  Lightheadedness, dizziness:  No  Neuro:  Patient denies new onset numbness or paresthesias      Objective:  Blood pressure 120/62, pulse 61, temperature 98.2  F (36.8  C), temperature source Oral, resp. rate 16, height 1.6 m (5' 2.99\"), weight 69.9 kg (154 lb), last menstrual period 05/08/2005, SpO2 93 %.    Patient Vitals for the past 24 hrs:   BP Temp Temp src Pulse Resp SpO2 Height Weight   06/28/23 0759 120/62 98.2  F (36.8  C) Oral 61 16 93 % -- --   06/28/23 0017 113/64 99.1  F (37.3  C) Oral 69 16 92 % -- --   06/27/23 2000 (!) 147/79 -- -- 89 -- -- -- --   06/27/23 1957 (!) 142/71 98.1  F (36.7  C) Oral 86 18 92 % -- --   06/27/23 1847 (!) 146/69 -- Oral 73 20 96 % -- --   06/27/23 1827 -- -- -- -- -- 95 % -- --   06/27/23 1815 (!) 144/92 -- -- 72 25 95 % -- --   06/27/23 1809 (!) 140/81 98.5  F (36.9  C) Oral 79 10 96 % -- --   06/27/23 1800 (!) 147/76 -- -- 76 11 95 % -- --   06/27/23 1745 139/76 -- -- 84 13 95 % -- --   06/27/23 1730 134/82 -- -- 84 12 94 % -- --   06/27/23 1720 120/74 -- -- 86 12 94 % -- --   06/27/23 1713 122/57 -- -- 96 11 95 % -- --   06/27/23 1709 109/65 99.1  F (37.3  C) Oral 95 13 94 % -- --   06/27/23 1308 (!) 140/80 98.7  F (37.1  C) Oral 68 14 97 % 1.6 m (5' 2.99\") 69.9 kg (154 lb)       Wt Readings from Last 4 Encounters:   06/27/23 69.9 kg (154 lb)   07/07/22 69.9 kg (154 lb)   01/23/19 69.9 kg (154 lb)   01/15/19 68 kg (150 lb)       Gen: A&O x 3. NAD. Appears comfortable.  Wound status: Covered, Ace wrap in place.   Circulation, motion and sensation: Dorsiflexion/plantarflexion intact and equal bilaterally; distal lower " extremity sensation is intact and equal bilaterally. Foot and toes are warm and well perfused.    Swelling: Mild  Calf tenderness: calves are soft and non-tender bilaterally     Pertinent Labs   Lab Results: personally reviewed.     Recent Labs   Lab Test 06/28/23  0516 07/08/22  0529 02/13/19  1324 01/23/19  1044 01/15/19  1741 01/14/19  1849   HGB 13.5 12.7  --   --   --  16.9*   HCT  --   --   --   --   --  48.5*   MCV  --   --   --   --   --  87   PLT  --   --   --   --   --  287   NA  --   --  138 137 139 134       Plan:   Continue current cares and rehabilitation.  Anticoagulation protocol:  mg daily  x 30  days  Pain medications: oxycodone, toradol, tylenol and vistaril  Weight bearing status:  WBAT  Disposition:  Plan for discharge to home with home health care when medically stable and pain is controlled, cleared by therapy. Likely later today.              Report completed by:  Doug Delgado PA-C  Date: 6/28/2023  Time: 8:59 AM

## (undated) DEVICE — DRSG AQUACEL AG 3.5X9.75" HYDROFIBER 412011

## (undated) DEVICE — GLOVE PROTEXIS W/NEU-THERA 8.0  2D73TE80

## (undated) DEVICE — TOURNIQUET SGL  BLADDER 30" DL PORT BLUE 5921-030-235

## (undated) DEVICE — Device

## (undated) DEVICE — BNDG COBAN 6"X5YDS STERILE

## (undated) DEVICE — SOL NACL 0.9% IRRIG 3000ML BAG 07972-08

## (undated) DEVICE — DECANTER VIAL 2006S

## (undated) DEVICE — ESU PENCIL SMOKE EVAC W/ROCKER SWITCH 0703-047-000

## (undated) DEVICE — GLOVE BIOGEL PI MICRO INDICATOR UNDERGLOVE SZ 6.5 48965

## (undated) DEVICE — SU PDO 1 STRATAFIX 36X36CM CTX TAPERPOINT SXPD2B405

## (undated) DEVICE — NDL SPINAL 18GA 3.5" 405184

## (undated) DEVICE — STOCKING SLEEVE COMPRESSION CALF MED

## (undated) DEVICE — BLADE SAW OSCIL/SAG STRK 11X90X1.19MM 4/2000 4111-119-090

## (undated) DEVICE — SU STRATAFIX MONOCRYL 3-0 SPIRAL PS-2 30CM SXMP1B106

## (undated) DEVICE — SOL WATER IRRIG 1000ML BOTTLE 07139-09

## (undated) DEVICE — DRSG STERI STRIP 1/2X4" R1547

## (undated) DEVICE — BLADE SAW SAGITTAL STRK 13X90X1.19MM HD SYS 6 6113-119-090

## (undated) DEVICE — SUCTION IRR SYSTEM W/TIP INTERPULSE

## (undated) DEVICE — GLOVE PROTEXIS BLUE W/NEU-THERA 6.5  2D73EB65

## (undated) DEVICE — DRAPE SHEET REV FOLD 3/4 9349

## (undated) DEVICE — SUTURE ABSORBABLE VICRYL CT-B1 L36 IN BRAID VIOLET JB947H

## (undated) DEVICE — BONE CLEANING TIP INTERPULSE  0210-010-000

## (undated) DEVICE — GOWN LG DISP 9515

## (undated) DEVICE — GLOVE BIOGEL PI MICRO SZ 6.5 48565

## (undated) DEVICE — BONE CEMENT KIT BOWL AND SPATULA STRK 6201-3-410

## (undated) DEVICE — GLOVE BIOGEL PI MICRO SZ 8.0 48580

## (undated) DEVICE — GOWN IMPERVIOUS SPECIALTY XLG/XLONG 32474

## (undated) DEVICE — SU MONOCRYL 2-0 CT-1 36" UND Y945H

## (undated) DEVICE — BLADE SAW SAGITTAL STRK 21X90X1.19MM HD SYS 6 6221-119-090

## (undated) DEVICE — PREP CHLORAPREP 26ML TINTED ORANGE  260815

## (undated) DEVICE — GLOVE PROTEXIS BLUE W/NEU-THERA 6.0  2D73EB60

## (undated) DEVICE — GLOVE PROTEXIS W/NEU-THERA 6.5  2D73TE65

## (undated) DEVICE — GLOVE BIOGEL PI MICRO INDICATOR UNDERGLOVE SZ 8.0 48980

## (undated) DEVICE — GOWN XLG DISP 9545

## (undated) DEVICE — SU VICRYL 1 CT-1 36" UND J947H

## (undated) DEVICE — SUCTION MANIFOLD NEPTUNE 2 SYS 4 PORT 0702-020-000

## (undated) DEVICE — SYR 20ML LL W/O NDL

## (undated) DEVICE — GLOVE PROTEXIS MICRO 5.5  2D73PM55

## (undated) RX ORDER — TRANEXAMIC ACID 10 MG/ML
INJECTION, SOLUTION INTRAVENOUS
Status: DISPENSED
Start: 2023-06-27

## (undated) RX ORDER — ROPIVACAINE HYDROCHLORIDE 5 MG/ML
INJECTION, SOLUTION EPIDURAL; INFILTRATION; PERINEURAL
Status: DISPENSED
Start: 2022-07-07

## (undated) RX ORDER — DEXAMETHASONE SODIUM PHOSPHATE 4 MG/ML
INJECTION, SOLUTION INTRA-ARTICULAR; INTRALESIONAL; INTRAMUSCULAR; INTRAVENOUS; SOFT TISSUE
Status: DISPENSED
Start: 2023-06-27

## (undated) RX ORDER — PROPOFOL 10 MG/ML
INJECTION, EMULSION INTRAVENOUS
Status: DISPENSED
Start: 2022-07-07

## (undated) RX ORDER — FENTANYL CITRATE 50 UG/ML
INJECTION, SOLUTION INTRAMUSCULAR; INTRAVENOUS
Status: DISPENSED
Start: 2022-07-07

## (undated) RX ORDER — LIDOCAINE HYDROCHLORIDE 10 MG/ML
INJECTION, SOLUTION EPIDURAL; INFILTRATION; INTRACAUDAL; PERINEURAL
Status: DISPENSED
Start: 2022-07-07

## (undated) RX ORDER — MAGNESIUM SULFATE HEPTAHYDRATE 40 MG/ML
INJECTION, SOLUTION INTRAVENOUS
Status: DISPENSED
Start: 2022-07-07

## (undated) RX ORDER — CEFAZOLIN SODIUM/WATER 2 G/20 ML
SYRINGE (ML) INTRAVENOUS
Status: DISPENSED
Start: 2023-06-27

## (undated) RX ORDER — ACETAMINOPHEN 325 MG/1
TABLET ORAL
Status: DISPENSED
Start: 2022-07-07

## (undated) RX ORDER — GLYCOPYRROLATE 0.2 MG/ML
INJECTION, SOLUTION INTRAMUSCULAR; INTRAVENOUS
Status: DISPENSED
Start: 2022-07-07

## (undated) RX ORDER — BUPIVACAINE HYDROCHLORIDE 5 MG/ML
INJECTION, SOLUTION EPIDURAL; INTRACAUDAL
Status: DISPENSED
Start: 2022-07-07

## (undated) RX ORDER — CELECOXIB 200 MG/1
CAPSULE ORAL
Status: DISPENSED
Start: 2022-07-07

## (undated) RX ORDER — FENTANYL CITRATE 50 UG/ML
INJECTION, SOLUTION INTRAMUSCULAR; INTRAVENOUS
Status: DISPENSED
Start: 2023-06-27

## (undated) RX ORDER — ONDANSETRON 2 MG/ML
INJECTION INTRAMUSCULAR; INTRAVENOUS
Status: DISPENSED
Start: 2023-06-27

## (undated) RX ORDER — HYDROMORPHONE HCL IN WATER/PF 6 MG/30 ML
PATIENT CONTROLLED ANALGESIA SYRINGE INTRAVENOUS
Status: DISPENSED
Start: 2023-06-27

## (undated) RX ORDER — GABAPENTIN 300 MG/1
CAPSULE ORAL
Status: DISPENSED
Start: 2022-07-07

## (undated) RX ORDER — CEFAZOLIN SODIUM 1 G/3ML
INJECTION, POWDER, FOR SOLUTION INTRAMUSCULAR; INTRAVENOUS
Status: DISPENSED
Start: 2022-07-07

## (undated) RX ORDER — ACETAMINOPHEN 325 MG/1
TABLET ORAL
Status: DISPENSED
Start: 2023-06-27

## (undated) RX ORDER — FENTANYL CITRATE-0.9 % NACL/PF 10 MCG/ML
PLASTIC BAG, INJECTION (ML) INTRAVENOUS
Status: DISPENSED
Start: 2022-07-07

## (undated) RX ORDER — BUPIVACAINE HYDROCHLORIDE 5 MG/ML
INJECTION, SOLUTION EPIDURAL; INTRACAUDAL
Status: DISPENSED
Start: 2023-06-27

## (undated) RX ORDER — ONDANSETRON 2 MG/ML
INJECTION INTRAMUSCULAR; INTRAVENOUS
Status: DISPENSED
Start: 2022-07-07

## (undated) RX ORDER — TRANEXAMIC ACID 650 MG/1
TABLET ORAL
Status: DISPENSED
Start: 2023-06-27

## (undated) RX ORDER — FENTANYL CITRATE-0.9 % NACL/PF 10 MCG/ML
PLASTIC BAG, INJECTION (ML) INTRAVENOUS
Status: DISPENSED
Start: 2023-06-27

## (undated) RX ORDER — TRANEXAMIC ACID 650 MG/1
TABLET ORAL
Status: DISPENSED
Start: 2022-07-07

## (undated) RX ORDER — EPHEDRINE SULFATE 50 MG/ML
INJECTION, SOLUTION INTRAMUSCULAR; INTRAVENOUS; SUBCUTANEOUS
Status: DISPENSED
Start: 2023-06-27

## (undated) RX ORDER — EPINEPHRINE 1 MG/ML
INJECTION, SOLUTION, CONCENTRATE INTRAVENOUS
Status: DISPENSED
Start: 2022-07-07

## (undated) RX ORDER — DEXAMETHASONE SODIUM PHOSPHATE 4 MG/ML
INJECTION, SOLUTION INTRA-ARTICULAR; INTRALESIONAL; INTRAMUSCULAR; INTRAVENOUS; SOFT TISSUE
Status: DISPENSED
Start: 2022-07-07